# Patient Record
Sex: MALE | Race: WHITE | NOT HISPANIC OR LATINO | Employment: OTHER | URBAN - METROPOLITAN AREA
[De-identification: names, ages, dates, MRNs, and addresses within clinical notes are randomized per-mention and may not be internally consistent; named-entity substitution may affect disease eponyms.]

---

## 2017-02-24 ENCOUNTER — OFFICE VISIT (OUTPATIENT)
Dept: INTERNAL MEDICINE | Facility: IMAGING CENTER | Age: 82
End: 2017-02-24
Payer: MEDICARE

## 2017-02-24 VITALS
WEIGHT: 187 LBS | TEMPERATURE: 97 F | SYSTOLIC BLOOD PRESSURE: 110 MMHG | HEART RATE: 109 BPM | HEIGHT: 68 IN | RESPIRATION RATE: 16 BRPM | OXYGEN SATURATION: 95 % | DIASTOLIC BLOOD PRESSURE: 70 MMHG | BODY MASS INDEX: 28.34 KG/M2

## 2017-02-24 DIAGNOSIS — F02.80 ALZHEIMER'S DEMENTIA WITHOUT BEHAVIORAL DISTURBANCE, UNSPECIFIED TIMING OF DEMENTIA ONSET: ICD-10-CM

## 2017-02-24 DIAGNOSIS — G30.9 ALZHEIMER'S DEMENTIA WITHOUT BEHAVIORAL DISTURBANCE, UNSPECIFIED TIMING OF DEMENTIA ONSET: ICD-10-CM

## 2017-02-24 PROCEDURE — G8599 NO ASA/ANTIPLAT THER USE RNG: HCPCS | Performed by: INTERNAL MEDICINE

## 2017-02-24 PROCEDURE — 1101F PT FALLS ASSESS-DOCD LE1/YR: CPT | Mod: 8P | Performed by: INTERNAL MEDICINE

## 2017-02-24 PROCEDURE — G8482 FLU IMMUNIZE ORDER/ADMIN: HCPCS | Performed by: INTERNAL MEDICINE

## 2017-02-24 PROCEDURE — G8432 DEP SCR NOT DOC, RNG: HCPCS | Performed by: INTERNAL MEDICINE

## 2017-02-24 PROCEDURE — G8420 CALC BMI NORM PARAMETERS: HCPCS | Performed by: INTERNAL MEDICINE

## 2017-02-24 PROCEDURE — 1036F TOBACCO NON-USER: CPT | Performed by: INTERNAL MEDICINE

## 2017-02-24 PROCEDURE — 99213 OFFICE O/P EST LOW 20 MIN: CPT | Performed by: INTERNAL MEDICINE

## 2017-02-24 PROCEDURE — 4040F PNEUMOC VAC/ADMIN/RCVD: CPT | Performed by: INTERNAL MEDICINE

## 2017-02-24 RX ORDER — RIVASTIGMINE 4.6 MG/24H
1 PATCH, EXTENDED RELEASE TRANSDERMAL DAILY
Qty: 30 PATCH | Refills: 3 | Status: SHIPPED | OUTPATIENT
Start: 2017-02-24 | End: 2017-05-12

## 2017-02-24 NOTE — MR AVS SNAPSHOT
"        Anthony Coleman   2017 2:30 PM   Office Visit   MRN: [id number]    [facility]  [facility]   Dept Phone:  [phone]    Description:  Male : 1929   Provider:  Phil Mcgee M.D.           Allergies as of 2017     Allergen Noted Reactions    Pcn [Penicillins] 2009   Itching    Sulfa Drugs 2009   Itching      You were diagnosed with     Alzheimer's dementia without behavioral disturbance, unspecified timing of dementia onset   [[id number]]         Vital Signs     Blood Pressure Pulse Temperature Respirations Height Weight    110/70 mmHg 109 36.1 °C (97 °F) 16 1.727 m (5' 8\") 84.823 kg (187 lb)    Body Mass Index Oxygen Saturation Smoking Status             28.44 kg/m2 95% Never Smoker          Basic Information     Date Of Birth Sex Race Ethnicity Preferred Language    1929 Male White Non- English      Problem List              ICD-10-CM Priority Class Noted - Resolved    Depression F32.9   Unknown - Present    Erectile dysfunction N52.9   Unknown - Present    Hyperlipemia E78.5   Unknown - Present    Hypertension I10   Unknown - Present    Hypogonadism    Unknown - Present    Hypogonadism male E29.1   2009 - Present    Aortic stenosis I35.0   2012 - Present    PVC (premature ventricular contraction) I49.3   2012 - Present    CAD (coronary artery disease) I25.10   2012 - Present    Elevated PSA R97.20   2012 - Present    Anesthesia of skin R20.0   3/12/2013 - Present    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   2015 - Present    Status post aortic valve replacement Z95.2   2015 - Present    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 Review all of your home medications and newly ordered medications with your provider and/or pharmacist. Follow medication instructions as directed by your provider and/or pharmacist. Please keep your medication list with you and share with your provider. Update the information when medications are discontinued, doses are changed, or new medications (including over-the-counter products) are added; and carry medication information at all times in the event of emergency situations     Allergies:  PCN - Itching     SULFA DRUGS - Itching               Medications  Valid as of: February 24, 2017 -  4:52 PM    Generic Name Brand Name Tablet Size Instructions for use    Donepezil HCl (Tab) ARICEPT 10 MG Take 1 Tab by mouth every day.        Escitalopram Oxalate (Tab) LEXAPRO 20 MG Take 1 Tab by mouth every day.        HydroCHLOROthiazide (Tab) HYDRODIURIL 25 MG TAKE 1 TABLET EVERY DAY        Ipratropium Bromide (Solution) ATROVENT 0.03 % Spray 2 Sprays in nose every 12 hours. As needed for rhinitis        Losartan Potassium (Tab) COZAAR 50 MG Take 1 Tab by mouth every day.        Memantine HCl (Tab) Memantine HCl 5 (28)-10 (21) MG Take as directed        Memantine HCl (Tab) Memantine HCl 5 (28)-10 (21) MG Follow titration pack directions        Rivastigmine (PATCH 24 HR) EXELON 4.6 MG/24HR Apply 1 Patch to skin as directed every day.        Triamcinolone Acetonide (Cream) KENALOG 0.1 % Apply to back as needed for skin condition/rash        .                 Medicines prescribed today were sent to:     [facility]/PHARMACY #[id number] - [place] - [address]    [address] [address]    Phone: [phone] Fax: [phone]    [place] [address]  Hours?: No    [facility] [place] [facility] [address] AT PORTAL TO REGISTERED [facility] SITES    [address] [place] [address]    Phone: [phone] Fax: [phone]    Open 24 Hours?: No      Medication refill instructions:       If your prescription bottle indicates you have medication refills left, it is not necessary to call your provider’s office. Please contact your pharmacy and they will refill your medication.    If your prescription bottle indicates you do not have any refills left, you may request refills at any time through one of the following ways: The online [employer] system (except Urgent Care), by calling your provider’s office, or by asking your pharmacy to contact your provider’s office with a refill request. Medication refills are processed only during regular business hours and may not be available until the next business day. Your provider may request additional information or to have a follow-up visit with you prior to refilling your medication.   *Please Note: Medication refills are assigned a new Rx number when refilled electronically. Your pharmacy may indicate that no refills were authorized even though a new prescription for the same medication is available at the pharmacy. Please request the medicine by name with the pharmacy before contacting your provider for a refill.        Other Notes About Your Plan     Colonoscopy Not indicated Dexa  PSA    A1c  Psych-[facility] ([phone]) Surgery-[facility] Urology-[facility] Oncology-[facility] Ophth-[facility]  Cardio - [facility]  11/7/13-Opt referral Dr. Portillo -- 5/5/14 Pt schedule and cancelled, never rescheduled.            MyChart Status: Patient Declined

## 2017-03-01 NOTE — PROGRESS NOTES
"Chief Complaint   Patient presents with   • Dementia       HISTORY OF THE PRESENT ILLNESS: Patient is a 87 y.o. male. Patient comes in with his wife for evaluation of episode of confusion and emotional irritability. This occurred after he went to the store. He first stopped at the pet store. He was intending to go to the grocery store but could not remember where to go or why he was to go there. He returned home and became emotionally upset. He is feeling better now. He is able to recall why he was going to the store. He is able to recall the events of the pet store.       Allergies: Pcn and Sulfa drugs    Current Outpatient Prescriptions Ordered in [facility]   Medication Sig Dispense Refill   • rivastigmine (EXELON) 4.6 MG/24HR patch Apply 1 Patch to skin as directed every day. 30 Patch 3   • Memantine HCl 5 (28)-10 (21) MG Tab Follow titration pack directions 1 Tab 0   • donepezil (ARICEPT) 10 MG tablet Take 1 Tab by mouth every day. 30 Tab 3   • Memantine HCl (NAMENDA TITRATION RYLAN) 5 (28)-10 (21) MG Tab Take as directed (Patient not taking: Reported on 10/26/2016) 49 Tab 0   • escitalopram (LEXAPRO) 20 MG tablet Take 1 Tab by mouth every day. 90 Tab 3   • losartan (COZAAR) 50 MG Tab Take 1 Tab by mouth every day. 90 Tab 3   • hydrochlorothiazide (HYDRODIURIL) 25 MG Tab TAKE 1 TABLET EVERY DAY (Patient not taking: Reported on 10/26/2016) 90 Tab 3   • ipratropium (ATROVENT) 0.03 % Solution Spray 2 Sprays in nose every 12 hours. As needed for rhinitis 3 Bottle 3   • triamcinolone acetonide (KENALOG) 0.1 % CREA Apply to back as needed for skin condition/rash 3 Tube 3     No current Epic-ordered facility-administered medications on file.       Past medical history, social history and family history were reviewed from chart today    Review of systems: Per HPI.   All others negative.     Exam: Blood pressure 110/70, pulse 109, temperature 36.1 °C (97 °F), resp. rate 16, height 1.727 m (5' 8\"), weight 84.823 kg (187 lb), " SpO2 95 %.  General: Well-appearing. Well-developed. No signs of distress.  HEENT: Grossly normal. Oral cavity is pink and moist.  Neck: Supple without JVD or bruit.  Pulmonary: Clear with good breath sounds. Normal effort.  Cardiovascular: Regular. Carotid and radial pulses are intact.  Abdomen: Soft, nontender, nondistended. Spleen and liver are not enlarged.  Neurologic: Cranial nerves II through XII are grossly normal, alert and oriented x3      Assessment/Plan  1. Alzheimer's dementia without behavioral disturbance, unspecified timing of dementia onset  rivastigmine (EXELON) 4.6 MG/24HR patch         Patient with moderate dementia. He has been diagnosed with Alzheimer's disease by neurology. He previously failed Aricept because of nightmares. His insurance would not cover Namenda.    I recommended a trial of Exelon. I discussed with the patient and his wife that this is similar to Aricept and he may experience similar side effects. If he does they should discontinue the patch. I also discussed with him the importance of rotating the area that the patch is applied. We also discussed appropriate places that the patch can be applied. We will increase the dose as tolerated. I would like to continue to pursue coadministration of Namenda if we get his insurance to cover this medication which would be appropriate for his condition. The medications have been shown to work better in conjunction. This may improve his ability to perform activities of daily living and improve his overall quality of life.

## 2017-03-22 ENCOUNTER — OFFICE VISIT (OUTPATIENT)
Dept: INTERNAL MEDICINE | Facility: IMAGING CENTER | Age: 82
End: 2017-03-22
Payer: MEDICARE

## 2017-03-22 VITALS
RESPIRATION RATE: 14 BRPM | BODY MASS INDEX: 28.34 KG/M2 | DIASTOLIC BLOOD PRESSURE: 60 MMHG | WEIGHT: 187 LBS | TEMPERATURE: 97.2 F | OXYGEN SATURATION: 95 % | HEART RATE: 94 BPM | SYSTOLIC BLOOD PRESSURE: 106 MMHG | HEIGHT: 68 IN

## 2017-03-22 DIAGNOSIS — L98.9 SKIN LESION OF HAND: ICD-10-CM

## 2017-03-22 PROCEDURE — 99213 OFFICE O/P EST LOW 20 MIN: CPT | Performed by: FAMILY MEDICINE

## 2017-03-22 PROCEDURE — G8599 NO ASA/ANTIPLAT THER USE RNG: HCPCS | Performed by: FAMILY MEDICINE

## 2017-03-22 PROCEDURE — G8482 FLU IMMUNIZE ORDER/ADMIN: HCPCS | Performed by: FAMILY MEDICINE

## 2017-03-22 PROCEDURE — 1101F PT FALLS ASSESS-DOCD LE1/YR: CPT | Mod: 8P | Performed by: FAMILY MEDICINE

## 2017-03-22 PROCEDURE — 1036F TOBACCO NON-USER: CPT | Performed by: FAMILY MEDICINE

## 2017-03-22 PROCEDURE — 4040F PNEUMOC VAC/ADMIN/RCVD: CPT | Performed by: FAMILY MEDICINE

## 2017-03-22 PROCEDURE — G8419 CALC BMI OUT NRM PARAM NOF/U: HCPCS | Performed by: FAMILY MEDICINE

## 2017-03-22 PROCEDURE — G8432 DEP SCR NOT DOC, RNG: HCPCS | Performed by: FAMILY MEDICINE

## 2017-03-22 RX ORDER — CEPHALEXIN 500 MG/1
500 CAPSULE ORAL 4 TIMES DAILY
Qty: 40 CAP | Refills: 0 | Status: SHIPPED | OUTPATIENT
Start: 2017-03-22 | End: 2017-05-12

## 2017-03-22 NOTE — PROGRESS NOTES
"CC: R hand lesion    HPI:  Patient is a 87 y.o. male established patient of Dr. Mcgee who presents today for evaluation of a new small lesion on the back of hand present for the past few weeks. Pt is a retired physician and attempted to \"unroof\" lesion with a sharp object. He was not successful and a thick white scab is present. Pt states that the lesion is tender with direct palpation/ denies trauma/ unclear if there is a foreign object in area/ no associated symptoms/ no OTC treatment used. Pt otherwise feels well and states that his chronic medical issues are well controlled.     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     Medications and allergies reviewed in Epic chart by me today.     ROS:  Pertinent positives listed above in HPI. All other systems have been reviewed and are negative.    PE:   /60 mmHg  Pulse 94  Temp(Src) 36.2 °C (97.2 °F)  Resp 14  Ht 1.727 m (5' 7.99\")  Wt 84.823 kg (187 lb)  BMI 28.44 kg/m2  SpO2 95%  Vital signs reviewed with patient.     Gen: Well developed; well nourished; no acute distress; age appropriate appearance; very happy affect  R hand: very small pea size white scab noted over affected skin area on back of hand/ minor elevation of area off skin surface/ very faint redness/ no fluctuance/ very tender with direct palpation/ no drainage/ no foreign object noted  Neuro: No major focal deficits noted "   Psych: AAOx3; mood and affect are appropriate for age and chronic diagnoses    A/P:  1. Skin lesion of hand of unclear etiology  At this time, there is nothing to incise or drain. Will try antibiotic trial/ OTC pain relief as needed with re-evaluation next week with Dr. Mcgee if it does not improve/ resolve. Pt educated to avoid touching area and not to try to unroof it further.   - cephALEXin (KEFLEX) 500 MG Cap; Take 1 Cap by mouth 4 times a day.  Dispense: 40 Cap; Refill: 0

## 2017-03-22 NOTE — MR AVS SNAPSHOT
"        Anthony Coleman   3/22/2017 2:00 PM   Office Visit   MRN: [id number]    [facility]   Dept Phone:  [phone]    Description:  Male : 1929   Provider:  Carina Zamora M.D.           Allergies as of 3/22/2017     Allergen Noted Reactions    Pcn [Penicillins] 2009   Itching    Sulfa Drugs 2009   Itching      You were diagnosed with     Skin lesion of hand   [[id number]]         Vital Signs     Blood Pressure Pulse Temperature Respirations Height Weight    106/60 mmHg 94 36.2 °C (97.2 °F) 14 1.727 m (5' 7.99\") 84.823 kg (187 lb)    Body Mass Index Oxygen Saturation Smoking Status             28.44 kg/m2 95% Never Smoker          Basic Information     Date Of Birth Sex Race Ethnicity Preferred Language    1929 Male White Non- English      Problem List              ICD-10-CM Priority Class Noted - Resolved    Depression F32.9   Unknown - Present    Erectile dysfunction N52.9   Unknown - Present    Hyperlipemia E78.5   Unknown - Present    Hypertension I10   Unknown - Present    Hypogonadism    Unknown - Present    Hypogonadism male E29.1   2009 - Present    Aortic stenosis I35.0   2012 - Present    PVC (premature ventricular contraction) I49.3   2012 - Present    CAD (coronary artery disease) I25.10   2012 - Present    Elevated PSA R97.20   2012 - Present    Anesthesia of skin R20.0   3/12/2013 - Present    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   2015 - Present    Status post aortic valve replacement Z95.2   2015 - Present    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 Review all of your home medications and newly ordered medications with your provider and/or pharmacist. Follow medication instructions as directed by your provider and/or pharmacist. Please keep your medication list with you and share with your provider. Update the information when medications are discontinued, doses are changed, or new medications (including over-the-counter products) are added; and carry medication information at all times in the event of emergency situations     Allergies:  PCN - Itching     SULFA DRUGS - Itching               Medications  Valid as of: March 22, 2017 -  3:04 PM    Generic Name Brand Name Tablet Size Instructions for use    Cephalexin (Cap) KEFLEX 500 MG Take 1 Cap by mouth 4 times a day.        Donepezil HCl (Tab) ARICEPT 10 MG Take 1 Tab by mouth every day.        Escitalopram Oxalate (Tab) LEXAPRO 20 MG Take 1 Tab by mouth every day.        HydroCHLOROthiazide (Tab) HYDRODIURIL 25 MG TAKE 1 TABLET EVERY DAY        Ipratropium Bromide (Solution) ATROVENT 0.03 % Spray 2 Sprays in nose every 12 hours. As needed for rhinitis        Losartan Potassium (Tab) COZAAR 50 MG Take 1 Tab by mouth every day.        Memantine HCl (Tab) Memantine HCl 5 (28)-10 (21) MG Take as directed        Memantine HCl (Tab) Memantine HCl 5 (28)-10 (21) MG Follow titration pack directions        Rivastigmine (PATCH 24 HR) EXELON 4.6 MG/24HR Apply 1 Patch to skin as directed every day.        Triamcinolone Acetonide (Cream) KENALOG 0.1 % Apply to back as needed for skin condition/rash        .                 Medicines prescribed today were sent to:     [facility]/PHARMACY #[id number] - [place] - [address] [address]    Phone: [phone] Fax: [phone]      Open 24 Hours?: No    [facility] [place] - [address] AT PORTAL TO REGISTERED [facility] SITES    [address] [facility] [address]    Phone: [phone] Fax: [phone]    Open 24 Hours?: No      Medication refill instructions:       If your prescription bottle indicates you have medication refills left, it is not necessary to call your provider’s office. Please contact your pharmacy and they will refill your medication.    If your prescription bottle indicates you do not have any refills left, you may request refills at any time through one of the following ways: The online [employer] system (except Urgent Care), by calling your provider’s office, or by asking your pharmacy to contact your provider’s office with a refill request. Medication refills are processed only during regular business hours and may not be available until the next business day. Your provider may request additional information or to have a follow-up visit with you prior to refilling your medication.   *Please Note: Medication refills are assigned a new Rx number when refilled electronically. Your pharmacy may indicate that no refills were authorized even though a new prescription for the same medication is available at the pharmacy. Please request the medicine by name with the pharmacy before contacting your provider for a refill.        Other Notes About Your Plan     Colonoscopy Not indicated Dexa  PSA    A1c  Psych-[facility] ([phone]) Surgery-[facility] Urology-[facility] Oncology-[facility] Op[facility]  Cardio - [facility]  11/7/13-Opt referral Dr. Portillo -- 5/5/14 Pt schedule and cancelled, never rescheduled.            MyChart Status: Patient Declined

## 2017-03-23 ENCOUNTER — OFFICE VISIT (OUTPATIENT)
Dept: INTERNAL MEDICINE | Facility: IMAGING CENTER | Age: 82
End: 2017-03-23
Payer: MEDICARE

## 2017-03-23 VITALS
TEMPERATURE: 97.3 F | WEIGHT: 187 LBS | HEART RATE: 80 BPM | BODY MASS INDEX: 28.34 KG/M2 | SYSTOLIC BLOOD PRESSURE: 110 MMHG | HEIGHT: 68 IN | RESPIRATION RATE: 14 BRPM | OXYGEN SATURATION: 97 % | DIASTOLIC BLOOD PRESSURE: 64 MMHG

## 2017-03-23 DIAGNOSIS — M25.562 ACUTE PAIN OF LEFT KNEE: ICD-10-CM

## 2017-03-23 PROCEDURE — G8482 FLU IMMUNIZE ORDER/ADMIN: HCPCS | Performed by: FAMILY MEDICINE

## 2017-03-23 PROCEDURE — G8432 DEP SCR NOT DOC, RNG: HCPCS | Performed by: FAMILY MEDICINE

## 2017-03-23 PROCEDURE — 99213 OFFICE O/P EST LOW 20 MIN: CPT | Performed by: FAMILY MEDICINE

## 2017-03-23 PROCEDURE — 4040F PNEUMOC VAC/ADMIN/RCVD: CPT | Performed by: FAMILY MEDICINE

## 2017-03-23 PROCEDURE — G8599 NO ASA/ANTIPLAT THER USE RNG: HCPCS | Performed by: FAMILY MEDICINE

## 2017-03-23 PROCEDURE — G8419 CALC BMI OUT NRM PARAM NOF/U: HCPCS | Performed by: FAMILY MEDICINE

## 2017-03-23 PROCEDURE — 1101F PT FALLS ASSESS-DOCD LE1/YR: CPT | Mod: 8P | Performed by: FAMILY MEDICINE

## 2017-03-23 PROCEDURE — 1036F TOBACCO NON-USER: CPT | Performed by: FAMILY MEDICINE

## 2017-03-23 NOTE — PROGRESS NOTES
"Chief Complaint   Patient presents with   • Knee Pain     left x 24 hours       HPI:  Patient is a 87 y.o. male established patient of Dr. Mcgee who presents today to have new L knee pain evaluated. I saw pt yesterday afternoon for a R back of hand lesion, and he did not mention anything about L knee pain at that visit. He states that his R hand lesion is already improving with initiation of abx treatment last night. Pt states that the L knee pain has been present for greater than 1 day but he cannot give me a specific time line. He denies trauma but he plays water volleyball multiple times a week (most recently yesterday). Pt feels that this pain is affecting his gait but staff observed pt walking normally for him both yesterday and today at the office. He cannot recall having pain in this area before. He freely admits he has a very low pain tolerance overall. His wife is present at today's appointment.     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     Medications and allergies reviewed in Epic chart by me today.     ROS:  Pertinent positives listed above in HPI. All other systems have been reviewed and are negative.    PE:   /64 mmHg  Pulse 80  Temp(Src) 36.3 °C (97.3 °F)  Resp 14  Ht 1.727 m (5' 7.99\")  Wt 84.823 kg (187 lb)  BMI 28.44 kg/m2  SpO2 97%  Vital signs reviewed with patient.     Gen: Well developed; well nourished; no " acute distress; age appropriate appearance   L Knee: with b/l knee comparison, no deformity noted of L knee/ no overt bruising or edema noted/ pt has pinpoint tenderness with direct palpation over L medial condyle area and distal to that area. No local edema noted/ no overlying erythema. Full ROM of L knee joint noted; gait normal for this pt witnessed today and yesterday   Neuro: No focal deficits noted   Psych: AAOx3; mood and affect are appropriate    A/P:  1. Acute pain of left knee of unclear etiology  I do not feel that this condition warrants imaging at this time. I recommended supportive care including rest/ icing affected area for 20 minutes on/ 60 minutes off with skin protection/ OTC pain relief medication PRN and no water volleyball this week.     Pt is to follow-up directly with Dr. Mcgee next week if this condition does not improve.

## 2017-03-23 NOTE — MR AVS SNAPSHOT
"        Anthony Coleman   3/23/2017 11:30 AM   Office Visit   MRN: [id number]    Department:  [facility]   Dept Phone:  [phone]    Description:  Male : 1929   Provider:  Carina Zamora M.D.           Reason for Visit     Knee Pain left x 24 hours      Allergies as of 3/23/2017     Allergen Noted Reactions    Pcn [Penicillins] 2009   Itching    Sulfa Drugs 2009   Itching      You were diagnosed with     Acute pain of left knee   [[id number]]         Vital Signs     Blood Pressure Pulse Temperature Respirations Height Weight    110/64 mmHg 80 36.3 °C (97.3 °F) 14 1.727 m (5' 7.99\") 84.823 kg (187 lb)    Body Mass Index Oxygen Saturation Smoking Status             28.44 kg/m2 97% Never Smoker          Basic Information     Date Of Birth Sex Race Ethnicity Preferred Language    1929 Male White Non- English      Problem List              ICD-10-CM Priority Class Noted - Resolved    Depression F32.9   Unknown - Present    Erectile dysfunction N52.9   Unknown - Present    Hyperlipemia E78.5   Unknown - Present    Hypertension I10   Unknown - Present    Hypogonadism    Unknown - Present    Hypogonadism male E29.1   2009 - Present    Aortic stenosis I35.0   2012 - Present    PVC (premature ventricular contraction) I49.3   2012 - Present    CAD (coronary artery disease) I25.10   2012 - Present    Elevated PSA R97.20   2012 - Present    Anesthesia of skin R20.0   3/12/2013 - Present    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   2015 - Present    Status post aortic valve replacement Z95.2   2015 - Present    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 Review all of your home medications and newly ordered medications with your provider and/or pharmacist. Follow medication instructions as directed by your provider and/or pharmacist. Please keep your medication list with you and share with your provider. Update the information when medications are discontinued, doses are changed, or new medications (including over-the-counter products) are added; and carry medication information at all times in the event of emergency situations     Allergies:  PCN - Itching     SULFA DRUGS - Itching               Medications  Valid as of: March 23, 2017 -  1:19 PM    Generic Name Brand Name Tablet Size Instructions for use    Cephalexin (Cap) KEFLEX 500 MG Take 1 Cap by mouth 4 times a day.        Donepezil HCl (Tab) ARICEPT 10 MG Take 1 Tab by mouth every day.        Escitalopram Oxalate (Tab) LEXAPRO 20 MG Take 1 Tab by mouth every day.        HydroCHLOROthiazide (Tab) HYDRODIURIL 25 MG TAKE 1 TABLET EVERY DAY        Ipratropium Bromide (Solution) ATROVENT 0.03 % Spray 2 Sprays in nose every 12 hours. As needed for rhinitis        Losartan Potassium (Tab) COZAAR 50 MG Take 1 Tab by mouth every day.        Memantine HCl (Tab) Memantine HCl 5 (28)-10 (21) MG Take as directed        Memantine HCl (Tab) Memantine HCl 5 (28)-10 (21) MG Follow titration pack directions        Rivastigmine (PATCH 24 HR) EXELON 4.6 MG/24HR Apply 1 Patch to skin as directed every day.        Triamcinolone Acetonide (Cream) KENALOG 0.1 % Apply to back as needed for skin condition/rash        .                 Medicines prescribed today were sent to:     [id number] - [place], [place] - [address] [place]  [place] [address]    Phone: [phone] Fax: [phone]    Open 24 Hours?: No    [facility] [place], [place] - [address] AT PORTAL TO REGISTERED [facility] SITES    [address] [facility] [address]    Phone: [phone] Fax: [phone]    Open 24 Hours?: No      Medication refill instructions:       If your prescription bottle indicates you have medication refills left, it is not necessary to call your provider’s office. Please contact your pharmacy and they will refill your medication.    If your prescription bottle indicates you do not have any refills left, you may request refills at any time through one of the following ways: The online [employer] system (except Urgent Care), by calling your provider’s office, or by asking your pharmacy to contact your provider’s office with a refill request. Medication refills are processed only during regular business hours and may not be available until the next business day. Your provider may request additional information or to have a follow-up visit with you prior to refilling your medication.   *Please Note: Medication refills are assigned a new Rx number when refilled electronically. Your pharmacy may indicate that no refills were authorized even though a new prescription for the same medication is available at the pharmacy. Please request the medicine by name with the pharmacy before contacting your provider for a refill.        Other Notes About Your Plan     Colonoscopy Not indicated Dexa  PSA    A1c  Psych-[facility] ([phone]) Surgery-[facility] Urology-[facility] Oncology-[place] Ophth-[place]  Cardio - [facility]-[place]  11/7/13-Opt referral Dr. Portillo -- 5/5/14 Pt schedule and cancelled, never rescheduled.            MyChart Status: Patient Declined

## 2017-04-11 DIAGNOSIS — I10 ESSENTIAL HYPERTENSION: ICD-10-CM

## 2017-04-11 RX ORDER — HYDROCHLOROTHIAZIDE 25 MG/1
TABLET ORAL
Qty: 90 TAB | Refills: 3 | Status: SHIPPED | OUTPATIENT
Start: 2017-04-11 | End: 2018-05-16 | Stop reason: SDUPTHER

## 2017-04-13 ENCOUNTER — HOSPITAL ENCOUNTER (OUTPATIENT)
Facility: MEDICAL CENTER | Age: 82
End: 2017-04-13
Attending: INTERNAL MEDICINE
Payer: MEDICARE

## 2017-04-13 ENCOUNTER — OFFICE VISIT (OUTPATIENT)
Dept: INTERNAL MEDICINE | Facility: IMAGING CENTER | Age: 82
End: 2017-04-13
Payer: MEDICARE

## 2017-04-13 VITALS
BODY MASS INDEX: 28.49 KG/M2 | OXYGEN SATURATION: 95 % | DIASTOLIC BLOOD PRESSURE: 60 MMHG | WEIGHT: 188 LBS | SYSTOLIC BLOOD PRESSURE: 102 MMHG | HEART RATE: 108 BPM | TEMPERATURE: 96.6 F | HEIGHT: 68 IN | RESPIRATION RATE: 16 BRPM

## 2017-04-13 DIAGNOSIS — D48.5 NEOPLASM OF UNCERTAIN BEHAVIOR OF SKIN: ICD-10-CM

## 2017-04-13 LAB — PATHOLOGY CONSULT NOTE: NORMAL

## 2017-04-13 PROCEDURE — 11100 PR BIOPSY OF SKIN LESION: CPT | Performed by: INTERNAL MEDICINE

## 2017-04-13 PROCEDURE — 88305 TISSUE EXAM BY PATHOLOGIST: CPT

## 2017-04-13 PROCEDURE — 99999 PR NO CHARGE: CPT | Performed by: INTERNAL MEDICINE

## 2017-04-14 NOTE — PROGRESS NOTES
Chief Complaint   Patient presents with   • Skin Lesion       HISTORY OF THE PRESENT ILLNESS: Patient is a 87 y.o. male.     Patient comes in for evaluation of a growth on his right hand. This is been ongoing for weeks or longer. He believes it is getting larger in size. He was treated with antibiotics with some improvement in are surrounding erythema but the lesion remains tender.    I discussed with the patient that I believe the skin lesion seems most consistent with basal cell carcinoma. Cannot exclude nodular squamous cell. I recommended biopsy. He was agreeable. We discussed the risks including bleeding, infection and scarring. He agreed to proceed.    Procedure:  skin biopsy for evaluation of atypical skin lesion.  The area was cleaned with alcohol and anesthetized with 1% lidocaine with epinephrine. It was then cleaned with Betadine. The lesion was biopsy with 5 mm punch biopsy. Compression is used for hemostasis. Three simple Vycril 30 sutures were placed. No complications. Post wound care was discussed.    Patient was instructed that he should not play water volleyball and should keep covered for one week until the sutures are removed.       Allergies: Pcn and Sulfa drugs    Current Outpatient Prescriptions Ordered in [facility]   Medication Sig Dispense Refill   • hydrochlorothiazide (HYDRODIURIL) 25 MG Tab TAKE 1 TABLET EVERY DAY 90 Tab 3   • cephALEXin (KEFLEX) 500 MG Cap Take 1 Cap by mouth 4 times a day. 40 Cap 0   • rivastigmine (EXELON) 4.6 MG/24HR patch Apply 1 Patch to skin as directed every day. 30 Patch 3   • Memantine HCl 5 (28)-10 (21) MG Tab Follow titration pack directions 1 Tab 0   • donepezil (ARICEPT) 10 MG tablet Take 1 Tab by mouth every day. 30 Tab 3   • Memantine HCl (NAMENDA TITRATION RYLAN) 5 (28)-10 (21) MG Tab Take as directed (Patient not taking: Reported on 10/26/2016) 49 Tab 0   • escitalopram (LEXAPRO) 20 MG tablet Take 1 Tab by mouth every day. 90 Tab 3   • losartan (COZAAR) 50 MG  "Tab Take 1 Tab by mouth every day. 90 Tab 3   • ipratropium (ATROVENT) 0.03 % Solution Spray 2 Sprays in nose every 12 hours. As needed for rhinitis 3 Bottle 3   • triamcinolone acetonide (KENALOG) 0.1 % CREA Apply to back as needed for skin condition/rash 3 Tube 3     No current Epic-ordered facility-administered medications on file.       Past medical history, social history and family history were reviewed from chart today    Review of systems: Per HPI.   All others negative.     Exam: Blood pressure 102/60, pulse 108, temperature 35.9 °C (96.6 °F), resp. rate 16, height 1.727 m (5' 8\"), weight 85.276 kg (188 lb), SpO2 95 %.  General: Well-nourished, well-developed. No change in appearance. No distress.  HEENT: Normocephalic.  Pulmonary: Clear.. Normal effort.  Cardiovascular: Regular   Abdomen: Normal appearing. Soft, nontender, nondistended.   Neurologic: Cranial nerves II through XII are grossly intact, alert and oriented x3  Skin: dorsum of right hand has a nodular, scaly, flesh colored lesion measuring approximately .5 cm circular. It is tender to palpate.        Assessment/Plan  1. Neoplasm of uncertain behavior of skin         Patient comes in for evaluation of a growth on his right hand. This is been ongoing for weeks or longer. He believes it is getting larger in size. He was treated with antibiotics with some improvement in are surrounding erythema but the lesion remains tender.    I discussed with the patient that I believe the skin lesion seems most consistent with basal cell carcinoma. Cannot exclude nodular squamous cell. I recommended biopsy. He was agreeable. We discussed the risks including bleeding, infection and scarring. He agreed to proceed.    Procedure:  skin biopsy for evaluation of atypical skin lesion.  The area was cleaned with alcohol and anesthetized with 1% lidocaine with epinephrine. It was then cleaned with Betadine. The lesion was biopsy with 5 mm punch biopsy. Compression is used " for hemostasis. Three simple Vycril 30 sutures were placed. No complications. Post wound care was discussed.    Patient was instructed that he should not play water volleyball and should keep covered for one week until the sutures are removed.

## 2017-04-20 ENCOUNTER — NON-PROVIDER VISIT (OUTPATIENT)
Dept: INTERNAL MEDICINE | Facility: IMAGING CENTER | Age: 82
End: 2017-04-20
Payer: MEDICARE

## 2017-04-20 NOTE — MR AVS SNAPSHOT
Anthony Coleman   2017 10:00 AM   Non-Provider Visit   MRN: [id number]    Department:  [facility]   Dept Phone:  [phone]    Description:  Male : 1929   Provider:  Joanie Harmon R.N.           Reason for Visit     Suture / Staple Removal           Allergies as of 2017     Allergen Noted Reactions    Pcn [Penicillins] 2009   Itching    Sulfa Drugs 2009   Itching      Vital Signs     Smoking Status                   Never Smoker            Basic Information     Date Of Birth Sex Race Ethnicity Preferred Language    1929 Male White Non- English      Problem List              ICD-10-CM Priority Class Noted - Resolved    Depression F32.9   Unknown - Present    Erectile dysfunction N52.9   Unknown - Present    Hyperlipemia E78.5   Unknown - Present    Hypertension I10   Unknown - Present    Hypogonadism    Unknown - Present    Hypogonadism male E29.1   2009 - Present    Aortic stenosis I35.0   2012 - Present    PVC (premature ventricular contraction) I49.3   2012 - Present    CAD (coronary artery disease) I25.10   2012 - Present    Elevated PSA R97.20   2012 - Present    Anesthesia of skin R20.0   3/12/2013 - Present    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   2015 - Present    Status post aortic valve replacement Z95.2   2015 - Present    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 Review all of your home medications and newly ordered medications with your provider and/or pharmacist. Follow medication instructions as directed by your provider and/or pharmacist. Please keep your medication list with you and share with your provider. Update the information when medications are discontinued, doses are changed, or new medications (including over-the-counter products) are added; and carry medication information at all times in the event of emergency situations     Allergies:  PCN - Itching     SULFA DRUGS - Itching               Medications  Valid as of: April 20, 2017 -  1:56 PM    Generic Name Brand Name Tablet Size Instructions for use    Cephalexin (Cap) KEFLEX 500 MG Take 1 Cap by mouth 4 times a day.        Donepezil HCl (Tab) ARICEPT 10 MG Take 1 Tab by mouth every day.        Escitalopram Oxalate (Tab) LEXAPRO 20 MG Take 1 Tab by mouth every day.        HydroCHLOROthiazide (Tab) HYDRODIURIL 25 MG TAKE 1 TABLET EVERY DAY        Ipratropium Bromide (Solution) ATROVENT 0.03 % Spray 2 Sprays in nose every 12 hours. As needed for rhinitis        Losartan Potassium (Tab) COZAAR 50 MG Take 1 Tab by mouth every day.        Memantine HCl (Tab) Memantine HCl 5 (28)-10 (21) MG Take as directed        Memantine HCl (Tab) Memantine HCl 5 (28)-10 (21) MG Follow titration pack directions        Rivastigmine (PATCH 24 HR) EXELON 4.6 MG/24HR Apply 1 Patch to skin as directed every day.        Triamcinolone Acetonide (Cream) KENALOG 0.1 % Apply to back as needed for skin condition/rash        .                 Medicines prescribed today were sent to:     [facility]/PHARMACY #[id number] - [place] - [address] [place] [place] [address]    Phone: [phone] Fax: [phone]    Open 24 Hours?: No    [facility] - [place] - [address] AT PORTAL TO REGISTERED [facility] SITES    [address] [facility] [address]    Phone: [phone]  Fax: [phone]    Open 24 Hours?: No      Medication refill instructions:       If your prescription bottle indicates you have medication refills left, it is not necessary to call your provider’s office. Please contact your pharmacy and they will refill your medication.    If your prescription bottle indicates you do not have any refills left, you may request refills at any time through one of the following ways: The online [employer] system (except Urgent Care), by calling your provider’s office, or by asking your pharmacy to contact your provider’s office with a refill request. Medication refills are processed only during regular business hours and may not be available until the next business day. Your provider may request additional information or to have a follow-up visit with you prior to refilling your medication.   *Please Note: Medication refills are assigned a new Rx number when refilled electronically. Your pharmacy may indicate that no refills were authorized even though a new prescription for the same medication is available at the pharmacy. Please request the medicine by name with the pharmacy before contacting your provider for a refill.        Other Notes About Your Plan     Colonoscopy Not indicated Dexa  PSA    A1c  Psych-[place] ([phone]) Surgery-[facility]rology-[facility] Oncology-[place] Ophth-[place]  Cardio - [facility]  11/7/13-[facility] referral Dr. Portillo -- 5/5/14 Pt schedule and cancelled, never rescheduled.            MyChart Status: Patient Declined

## 2017-05-09 ENCOUNTER — NON-PROVIDER VISIT (OUTPATIENT)
Dept: INTERNAL MEDICINE | Facility: IMAGING CENTER | Age: 82
End: 2017-05-09
Payer: MEDICARE

## 2017-06-02 ENCOUNTER — OFFICE VISIT (OUTPATIENT)
Dept: INTERNAL MEDICINE | Facility: IMAGING CENTER | Age: 82
End: 2017-06-02
Payer: MEDICARE

## 2017-06-02 VITALS
DIASTOLIC BLOOD PRESSURE: 60 MMHG | RESPIRATION RATE: 14 BRPM | WEIGHT: 191.4 LBS | BODY MASS INDEX: 29.01 KG/M2 | HEART RATE: 80 BPM | TEMPERATURE: 97.4 F | HEIGHT: 68 IN | SYSTOLIC BLOOD PRESSURE: 100 MMHG | OXYGEN SATURATION: 95 %

## 2017-06-02 DIAGNOSIS — R05.9 COUGH: ICD-10-CM

## 2017-06-02 DIAGNOSIS — N64.4 BREAST TENDERNESS: ICD-10-CM

## 2017-06-02 PROCEDURE — 1036F TOBACCO NON-USER: CPT | Performed by: INTERNAL MEDICINE

## 2017-06-02 PROCEDURE — 4040F PNEUMOC VAC/ADMIN/RCVD: CPT | Performed by: INTERNAL MEDICINE

## 2017-06-02 PROCEDURE — 1101F PT FALLS ASSESS-DOCD LE1/YR: CPT | Mod: 8P | Performed by: INTERNAL MEDICINE

## 2017-06-02 PROCEDURE — 99214 OFFICE O/P EST MOD 30 MIN: CPT | Performed by: INTERNAL MEDICINE

## 2017-06-02 PROCEDURE — G8599 NO ASA/ANTIPLAT THER USE RNG: HCPCS | Performed by: INTERNAL MEDICINE

## 2017-06-02 PROCEDURE — G8432 DEP SCR NOT DOC, RNG: HCPCS | Performed by: INTERNAL MEDICINE

## 2017-06-02 PROCEDURE — G8419 CALC BMI OUT NRM PARAM NOF/U: HCPCS | Performed by: INTERNAL MEDICINE

## 2017-06-02 NOTE — MR AVS SNAPSHOT
"        Anthony Coleman   2017 11:30 AM   Office Visit   MRN: [id number]    [facility]   Dept Phone:  [phone]    Description:  Male : 1929   Provider:  Phil Mcgee M.D.           Reason for Visit     Cough     Other tender left breast      Allergies as of 2017     Allergen Noted Reactions    Pcn [Penicillins] 2009   Itching    Sulfa Drugs 2009   Itching      You were diagnosed with     Cough   [786.2.ICD-9-CM]       Breast tenderness   [856868]         Vital Signs     Blood Pressure Pulse Temperature Respirations Height Weight    100/60 mmHg 80 36.3 °C (97.4 °F) 14 1.727 m (5' 7.99\") 86.818 kg (191 lb 6.4 oz)    Body Mass Index Oxygen Saturation Smoking Status             29.11 kg/m2 95% Never Smoker          Basic Information     Date Of Birth Sex Race Ethnicity Preferred Language    1929 Male White Non- English      Problem List              ICD-10-CM Priority Class Noted - Resolved    Depression F32.9   Unknown - Present    Erectile dysfunction N52.9   Unknown - Present    Hyperlipemia E78.5   Unknown - Present    Hypertension I10   Unknown - Present    Hypogonadism    Unknown - Present    Hypogonadism male E29.1   2009 - Present    Aortic stenosis I35.0   2012 - Present    PVC (premature ventricular contraction) I49.3   2012 - Present    CAD (coronary artery disease) I25.10   2012 - Present    Elevated PSA R97.20   2012 - Present    Anesthesia of skin R20.0   3/12/2013 - Present    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   2015 - Present    Status post aortic valve replacement Z95.2   2015 - Present    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 Review all of your home medications and newly ordered medications with your provider and/or pharmacist. Follow medication instructions as directed by your provider and/or pharmacist. Please keep your medication list with you and share with your provider. Update the information when medications are discontinued, doses are changed, or new medications (including over-the-counter products) are added; and carry medication information at all times in the event of emergency situations     Allergies:  PCN - Itching     SULFA DRUGS - Itching               Medications  Valid as of: June 02, 2017 - 12:02 PM    Generic Name Brand Name Tablet Size Instructions for use    Escitalopram Oxalate (Tab) LEXAPRO 20 MG Take 1 Tab by mouth every day.        HydroCHLOROthiazide (Tab) HYDRODIURIL 25 MG TAKE 1 TABLET EVERY DAY        Ipratropium Bromide (Solution) ATROVENT 0.03 % Spray 2 Sprays in nose every 12 hours. As needed for rhinitis        Losartan Potassium (Tab) COZAAR 50 MG Take 1 Tab by mouth every day.        Triamcinolone Acetonide (Cream) KENALOG 0.1 % Apply to back as needed for skin condition/rash        .                 Medicines prescribed today were sent to:     [facility]/PHARMACY #[id number] - [place] - [address] [place] [address]    Phone: [phone] Fax: [phone]    Open 24 Hours?: No    [facility] - [place], [place] - [address] AT PORTAL TO REGISTERED [facility] SITES    [address] [facility] [address]    Phone: [phone] Fax: [phone]    Open 24 Hours?: No      Medication refill instructions:       If your prescription bottle indicates you  have medication refills left, it is not necessary to call your provider’s office. Please contact your pharmacy and they will refill your medication.    If your prescription bottle indicates you do not have any refills left, you may request refills at any time through one of the following ways: The online [employer] system (except Urgent Care), by calling your provider’s office, or by asking your pharmacy to contact your provider’s office with a refill request. Medication refills are processed only during regular business hours and may not be available until the next business day. Your provider may request additional information or to have a follow-up visit with you prior to refilling your medication.   *Please Note: Medication refills are assigned a new Rx number when refilled electronically. Your pharmacy may indicate that no refills were authorized even though a new prescription for the same medication is available at the pharmacy. Please request the medicine by name with the pharmacy before contacting your provider for a refill.        Your To Do List     Future Labs/Procedures Complete By Expires    MA-MAMMO DIAGNOSTIC UNILAT W/MILVIA W/CAD LEFT  As directed 6/2/2018      Other Notes About Your Plan     Colonoscopy Not indicated Dexa  PSA    A1c  Psych-[place] ([phone]) Surgery-[facility] Urology-[facility] Oncology-[place] Ophth-[place]  Cardio - [facility]  11/7/13-Opt referral Dr. Portillo -- 5/5/14 Pt schedule and cancelled, never rescheduled.            [facility] Status: Patient Declined

## 2017-06-02 NOTE — PROGRESS NOTES
"Chief Complaint   Patient presents with   • Cough   • Other     tender left breast       HISTORY OF THE PRESENT ILLNESS: Patient is a 87 y.o. male. Patient comes in with complaint of left breast tenderness and cough.    Cough-symptoms for 2 months. No obvious aggravating factors. Symptoms are improved when he uses a cough drop. Cough is nonproductive. No fever or chills. No wheezing or sputum production.    Breast tenderness-symptoms for 2 or more months. Tenderness is over the nipple and areola on the left only. No trauma. No bruising. No change in medications. No history of breast issues. No discharge from the breasts. No alleviating factors. Aggravated by palpation.       Allergies: Pcn and Sulfa drugs    Current Outpatient Prescriptions Ordered in [facility]   Medication Sig Dispense Refill   • hydrochlorothiazide (HYDRODIURIL) 25 MG Tab TAKE 1 TABLET EVERY DAY 90 Tab 3   • escitalopram (LEXAPRO) 20 MG tablet Take 1 Tab by mouth every day. 90 Tab 3   • losartan (COZAAR) 50 MG Tab Take 1 Tab by mouth every day. 90 Tab 3   • ipratropium (ATROVENT) 0.03 % Solution Spray 2 Sprays in nose every 12 hours. As needed for rhinitis 3 Bottle 3   • triamcinolone acetonide (KENALOG) 0.1 % CREA Apply to back as needed for skin condition/rash 3 Tube 3     No current Epic-ordered facility-administered medications on file.       Past medical history, social history and family history were reviewed from chart today    Review of systems: Per HPI.   All others negative.     Exam: Blood pressure 100/60, pulse 80, temperature 36.3 °C (97.4 °F), resp. rate 14, height 1.727 m (5' 7.99\"), weight 86.818 kg (191 lb 6.4 oz), SpO2 95 %.  General: Well-nourished, well-developed. No change in appearance. No distress.  HEENT: Normocephalic. Nasal cavities are edematous with clear/yellow discharge. Oral cavity is pink and moist. No exudate or lesion.  Pulmonary: Clear.. Normal effort.  Cardiovascular: Regular   Abdomen: Normal appearing. Soft, " nontender, nondistended.   Neurologic: Cranial nerves II through XII are grossly intact, alert and oriented x3  Breast: Bilateral breast are normal in appearance and symmetric bilaterally. Palpation is normal bilaterally. He has tenderness with palpation of the areola and nipple on the left. No discharge from either breast. No skin abnormalities.      Assessment/Plan  1. Cough     2. Breast tenderness  MA-MAMMO DIAGNOSTIC UNILAT W/MILVIA W/CAD LEFT       I suspect his cough is either from allergies, reflux or combination of both. His pulmonary exam is unremarkable. His nasal exam is positive for discharge. I recommended the following:  Allegra 180 mg daily  Flonase one spray in each nostril daily  Zantac 150 mg once daily  Symptoms persist I will order a chest x-ray.    His breast tenderness is probably due to imbalance of testosterone/estrogen with hypertrophy of breast tissue. He is not on any typical medications including digoxin or potassium sparing diuretic that would typically cause these abnormalities. His exam is unremarkable. I recommended the following:  Imaging to rule out underlying disease. Mammogram ordered as above.  If the mammogram is negative consider starting DHEA.

## 2017-07-03 ENCOUNTER — HOSPITAL ENCOUNTER (OUTPATIENT)
Dept: RADIOLOGY | Facility: MEDICAL CENTER | Age: 82
End: 2017-07-03
Attending: INTERNAL MEDICINE
Payer: MEDICARE

## 2017-07-03 DIAGNOSIS — N64.4 BREAST PAIN: ICD-10-CM

## 2017-07-03 PROCEDURE — 76642 ULTRASOUND BREAST LIMITED: CPT | Mod: LT

## 2017-07-03 PROCEDURE — G0204 DX MAMMO INCL CAD BI: HCPCS

## 2017-07-13 DIAGNOSIS — I10 ESSENTIAL HYPERTENSION: ICD-10-CM

## 2017-07-13 RX ORDER — LOSARTAN POTASSIUM 50 MG/1
50 TABLET ORAL DAILY
Qty: 90 TAB | Refills: 3 | Status: SHIPPED | OUTPATIENT
Start: 2017-07-13 | End: 2018-11-02 | Stop reason: SDUPTHER

## 2017-07-31 ENCOUNTER — HOSPITAL ENCOUNTER (OUTPATIENT)
Dept: LAB | Facility: MEDICAL CENTER | Age: 82
End: 2017-07-31
Attending: UROLOGY
Payer: MEDICARE

## 2017-07-31 LAB
PSA SERPL-MCNC: 1.59 NG/ML (ref 0–4)
TESTOST SERPL-MCNC: 103 NG/DL (ref 175–781)

## 2017-07-31 PROCEDURE — 84403 ASSAY OF TOTAL TESTOSTERONE: CPT

## 2017-07-31 PROCEDURE — 36415 COLL VENOUS BLD VENIPUNCTURE: CPT

## 2017-07-31 PROCEDURE — 84153 ASSAY OF PSA TOTAL: CPT

## 2017-08-24 DIAGNOSIS — F33.40 RECURRENT MAJOR DEPRESSIVE DISORDER, IN REMISSION (HCC): ICD-10-CM

## 2017-08-24 RX ORDER — ESCITALOPRAM OXALATE 20 MG/1
20 TABLET ORAL DAILY
Qty: 90 TAB | Refills: 3 | Status: SHIPPED | OUTPATIENT
Start: 2017-08-24 | End: 2019-03-11 | Stop reason: SDUPTHER

## 2017-09-07 ENCOUNTER — OFFICE VISIT (OUTPATIENT)
Dept: INTERNAL MEDICINE | Facility: IMAGING CENTER | Age: 82
End: 2017-09-07
Payer: MEDICARE

## 2017-09-07 VITALS
HEIGHT: 68 IN | HEART RATE: 96 BPM | RESPIRATION RATE: 16 BRPM | TEMPERATURE: 97.4 F | DIASTOLIC BLOOD PRESSURE: 70 MMHG | SYSTOLIC BLOOD PRESSURE: 124 MMHG | BODY MASS INDEX: 29.25 KG/M2 | OXYGEN SATURATION: 97 % | WEIGHT: 193 LBS

## 2017-09-07 DIAGNOSIS — Q80.9 XERODERMA: ICD-10-CM

## 2017-09-07 DIAGNOSIS — M19.042 PRIMARY OSTEOARTHRITIS OF BOTH HANDS: ICD-10-CM

## 2017-09-07 DIAGNOSIS — M19.041 PRIMARY OSTEOARTHRITIS OF BOTH HANDS: ICD-10-CM

## 2017-09-07 PROCEDURE — 99213 OFFICE O/P EST LOW 20 MIN: CPT | Performed by: INTERNAL MEDICINE

## 2017-09-07 NOTE — PROGRESS NOTES
"Chief Complaint   Patient presents with   • Itchy     skin on back       HISTORY OF THE PRESENT ILLNESS: Patient is a 88 y.o. male. Patient comes in for evaluation of itchy skin on the back. Symptoms have been present for more than 10 years. No obvious aggravating factors. It is alleviated by itching. No specific rash. He has tried topical hydrocortisone with no benefit. He swims 3 days weekly in a water volleyball team. Denies use of harsh soaps. He showers 2 or 3 days weekly.    He is also inquiring about alternative gel for use of hand osteoarthritis.       Allergies: Pcn [penicillins] and Sulfa drugs    Current Outpatient Prescriptions Ordered in [facility]   Medication Sig Dispense Refill   • Diclofenac Sodium 1 % Gel Apply 2 g to each hand 4 times daily as needed for arthritis pain. 100 g 3   • escitalopram (LEXAPRO) 20 MG tablet Take 1 Tab by mouth every day. 90 Tab 3   • losartan (COZAAR) 50 MG Tab Take 1 Tab by mouth every day. 90 Tab 3   • hydrochlorothiazide (HYDRODIURIL) 25 MG Tab TAKE 1 TABLET EVERY DAY 90 Tab 3   • ipratropium (ATROVENT) 0.03 % Solution Spray 2 Sprays in nose every 12 hours. As needed for rhinitis 3 Bottle 3   • triamcinolone acetonide (KENALOG) 0.1 % CREA Apply to back as needed for skin condition/rash 3 Tube 3     No current Epic-ordered facility-administered medications on file.        Past medical history, social history and family history were reviewed from chart today    Review of systems: Per HPI.   All others negative.     Exam: Blood pressure 124/70, pulse 96, temperature 36.3 °C (97.4 °F), resp. rate 16, height 1.727 m (5' 8\"), weight 87.5 kg (193 lb), SpO2 97 %.  General: Well-nourished, well-developed. No change in appearance. No distress.  HEENT: Normocephalic.  Pulmonary: Clear. Normal effort.  Cardiovascular: Regular   Abdomen: Normal appearing. Soft, nontender, nondistended.   Neurologic: Cranial nerves II through XII are grossly intact, alert and oriented x3  Skin: Patient " has generalized dry skin on the back. He has a more significant dry patch in the right upper back.      Assessment/Plan  1. Xeroderma     2. Primary osteoarthritis of both hands  Diclofenac Sodium 1 % Gel       Discussed skin care including cool or warm showers with mild soap such as Dove or Jose 2000. I also recommend that he pat dry after. We discussed the importance of hydration. I also recommended topical moisturizer.    He has a history of osteoarthritis in the thumbs. Think it is reasonable for him to try Voltaren gel. He believes that he had a sample from her friend in the past and that it worked well.

## 2017-09-27 ENCOUNTER — NON-PROVIDER VISIT (OUTPATIENT)
Dept: INTERNAL MEDICINE | Facility: IMAGING CENTER | Age: 82
End: 2017-09-27
Payer: MEDICARE

## 2017-09-27 DIAGNOSIS — Z23 NEED FOR INFLUENZA VACCINATION: ICD-10-CM

## 2017-09-27 PROCEDURE — 90662 IIV NO PRSV INCREASED AG IM: CPT | Performed by: INTERNAL MEDICINE

## 2017-09-27 PROCEDURE — G0008 ADMIN INFLUENZA VIRUS VAC: HCPCS | Performed by: INTERNAL MEDICINE

## 2018-01-12 ENCOUNTER — OFFICE VISIT (OUTPATIENT)
Dept: INTERNAL MEDICINE | Facility: IMAGING CENTER | Age: 83
End: 2018-01-12
Payer: MEDICARE

## 2018-01-12 VITALS
BODY MASS INDEX: 29.25 KG/M2 | SYSTOLIC BLOOD PRESSURE: 130 MMHG | WEIGHT: 193 LBS | HEIGHT: 68 IN | RESPIRATION RATE: 14 BRPM | DIASTOLIC BLOOD PRESSURE: 72 MMHG | HEART RATE: 68 BPM | OXYGEN SATURATION: 97 %

## 2018-01-12 DIAGNOSIS — F32.2 SEVERE SINGLE CURRENT EPISODE OF MAJOR DEPRESSIVE DISORDER, WITHOUT PSYCHOTIC FEATURES (HCC): ICD-10-CM

## 2018-01-12 DIAGNOSIS — H61.23 BILATERAL IMPACTED CERUMEN: ICD-10-CM

## 2018-01-12 PROCEDURE — 69210 REMOVE IMPACTED EAR WAX UNI: CPT | Performed by: INTERNAL MEDICINE

## 2018-01-12 PROCEDURE — 99213 OFFICE O/P EST LOW 20 MIN: CPT | Mod: 25 | Performed by: INTERNAL MEDICINE

## 2018-01-16 NOTE — PROGRESS NOTES
"Chief Complaint   Patient presents with   • Cerumen Impaction       HISTORY OF THE PRESENT ILLNESS: Patient is a 88 y.o. male. Patient comes in initially for bilateral cerumen impaction. He is asking about his lavage and removal of the ear wax. He has a history of cerumen impaction which affects his hearing.    His wife encouraged him to discuss his depression. Patient reports that he is feeling very depressed. He has suicidal thoughts. He has a history of depression. He tends to become more reactive when his wife is leaving town and she is planning to go to [place]. He does not have a specific plan.       Allergies: Pcn [penicillins] and Sulfa drugs    Current Outpatient Prescriptions Ordered in [facility]   Medication Sig Dispense Refill   • Diclofenac Sodium 1 % Gel Apply 2 g to each hand 4 times daily as needed for arthritis pain. 100 g 3   • escitalopram (LEXAPRO) 20 MG tablet Take 1 Tab by mouth every day. 90 Tab 3   • losartan (COZAAR) 50 MG Tab Take 1 Tab by mouth every day. 90 Tab 3   • hydrochlorothiazide (HYDRODIURIL) 25 MG Tab TAKE 1 TABLET EVERY DAY 90 Tab 3   • ipratropium (ATROVENT) 0.03 % Solution Spray 2 Sprays in nose every 12 hours. As needed for rhinitis 3 Bottle 3   • triamcinolone acetonide (KENALOG) 0.1 % CREA Apply to back as needed for skin condition/rash 3 Tube 3     No current Epic-ordered facility-administered medications on file.        Past medical history, social history and family history were reviewed from chart today    Review of systems: Per HPI. All others negative.     Exam: Blood pressure 130/72, pulse 68, resp. rate 14, height 1.727 m (5' 7.99\"), weight 87.5 kg (193 lb), SpO2 97 %.  General: Well-nourished, well-developed. No change in appearance. No distress.  HEENT: Normocephalic. Bilateral canals were obstructed with cerumen. These were normal after lavage and manual extraction with curette and forceps  Pulmonary: Clear.. Normal effort.  Cardiovascular: Regular   Abdomen: " Normal appearing. Soft, nontender, nondistended.   Neurologic: Cranial nerves II through XII are grossly intact, alert and oriented x3    It was discussed with the patient that they have a cerumen impaction. The risks and benefits of removal were discussed. Specifically we discussed the benefits of reduced risk of infection, improved visualization of the landmarks and to improve discomfort due to  impaction. Risk were discussed and included but not limited to infection, bleeding, pain and possibly tympanic membrane perforation. A large cerumen plug was removed from bilateral ear canals. The ear was lavaged with water and forceps were used to remove the plug. No complications from some impaction removal. Post cerumen impaction care was discussed. Encouraged regular cleaning but to avoid deep penetration with foreign objects such as a Q-tip.      Assessment/Plan  1. Bilateral impacted cerumen     2. Severe single current episode of major depressive disorder, without psychotic features (CMS-HCC)         Patient underwent bilateral cerumen extraction due to decreased hearing. No complications.    I discussed with the patient I'm more concerned regarding his depression. He has a history of depression which becomes more symptomatic or reactive when his wife leaves town. He states to me that he is considering suicide. He has no specific plan. When I pressed him further he said he would not do it because he is a coward. He is emotional during the visit. His wife will consider canceling her trip. I recommended evaluation at [facility]. I spoke with the intake were negative. His wife reports that she will take him there for evaluation today.

## 2018-03-22 DIAGNOSIS — D64.9 ANEMIA, UNSPECIFIED TYPE: ICD-10-CM

## 2018-03-22 DIAGNOSIS — E78.00 PURE HYPERCHOLESTEROLEMIA: ICD-10-CM

## 2018-03-22 DIAGNOSIS — I10 ESSENTIAL HYPERTENSION: ICD-10-CM

## 2018-05-09 ENCOUNTER — OFFICE VISIT (OUTPATIENT)
Dept: INTERNAL MEDICINE | Facility: IMAGING CENTER | Age: 83
End: 2018-05-09
Payer: MEDICARE

## 2018-05-09 VITALS
HEIGHT: 68 IN | HEART RATE: 93 BPM | RESPIRATION RATE: 16 BRPM | TEMPERATURE: 96.8 F | OXYGEN SATURATION: 96 % | BODY MASS INDEX: 28.34 KG/M2 | DIASTOLIC BLOOD PRESSURE: 70 MMHG | SYSTOLIC BLOOD PRESSURE: 120 MMHG | WEIGHT: 187 LBS

## 2018-05-09 DIAGNOSIS — M75.41 IMPINGEMENT SYNDROME OF RIGHT SHOULDER: ICD-10-CM

## 2018-05-09 DIAGNOSIS — S29.012A UPPER BACK STRAIN, INITIAL ENCOUNTER: ICD-10-CM

## 2018-05-09 DIAGNOSIS — R49.0 HOARSE VOICE QUALITY: ICD-10-CM

## 2018-05-09 PROCEDURE — 99213 OFFICE O/P EST LOW 20 MIN: CPT | Performed by: INTERNAL MEDICINE
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
## 2018-05-16 DIAGNOSIS — I10 ESSENTIAL HYPERTENSION: ICD-10-CM

## 2018-05-17 RX ORDER — HYDROCHLOROTHIAZIDE 25 MG/1
TABLET ORAL
Qty: 90 TAB | Refills: 2 | Status: SHIPPED | OUTPATIENT
Start: 2018-05-17 | End: 2018-11-19

## 2018-05-25 DIAGNOSIS — F32.1 CURRENT MODERATE EPISODE OF MAJOR DEPRESSIVE DISORDER WITHOUT PRIOR EPISODE (HCC): ICD-10-CM

## 2018-05-25 DIAGNOSIS — F03.90 DEMENTIA WITHOUT BEHAVIORAL DISTURBANCE, UNSPECIFIED DEMENTIA TYPE: ICD-10-CM

## 2018-05-30 ENCOUNTER — HOSPITAL ENCOUNTER (OUTPATIENT)
Facility: MEDICAL CENTER | Age: 83
End: 2018-05-30
Attending: INTERNAL MEDICINE
Payer: MEDICARE

## 2018-05-30 ENCOUNTER — NON-PROVIDER VISIT (OUTPATIENT)
Dept: INTERNAL MEDICINE | Facility: IMAGING CENTER | Age: 83
End: 2018-05-30
Payer: MEDICARE

## 2018-05-30 DIAGNOSIS — D64.9 ANEMIA, UNSPECIFIED TYPE: ICD-10-CM

## 2018-05-30 DIAGNOSIS — I10 ESSENTIAL HYPERTENSION: ICD-10-CM

## 2018-05-30 DIAGNOSIS — E78.00 PURE HYPERCHOLESTEROLEMIA: ICD-10-CM

## 2018-05-30 LAB
BASOPHILS # BLD AUTO: 0.2 % (ref 0–1.8)
BASOPHILS # BLD: 0.02 K/UL (ref 0–0.12)
EOSINOPHIL # BLD AUTO: 0.37 K/UL (ref 0–0.51)
EOSINOPHIL NFR BLD: 4.3 % (ref 0–6.9)
ERYTHROCYTE [DISTWIDTH] IN BLOOD BY AUTOMATED COUNT: 51.8 FL (ref 35.9–50)
HCT VFR BLD AUTO: 39.9 % (ref 42–52)
HGB BLD-MCNC: 12.9 G/DL (ref 14–18)
IMM GRANULOCYTES # BLD AUTO: 0.03 K/UL (ref 0–0.11)
IMM GRANULOCYTES NFR BLD AUTO: 0.3 % (ref 0–0.9)
LYMPHOCYTES # BLD AUTO: 1.03 K/UL (ref 1–4.8)
LYMPHOCYTES NFR BLD: 11.9 % (ref 22–41)
MCH RBC QN AUTO: 30.3 PG (ref 27–33)
MCHC RBC AUTO-ENTMCNC: 32.3 G/DL (ref 33.7–35.3)
MCV RBC AUTO: 93.7 FL (ref 81.4–97.8)
MONOCYTES # BLD AUTO: 0.72 K/UL (ref 0–0.85)
MONOCYTES NFR BLD AUTO: 8.3 % (ref 0–13.4)
NEUTROPHILS # BLD AUTO: 6.51 K/UL (ref 1.82–7.42)
NEUTROPHILS NFR BLD: 75 % (ref 44–72)
NRBC # BLD AUTO: 0 K/UL
NRBC BLD-RTO: 0 /100 WBC
PLATELET # BLD AUTO: 254 K/UL (ref 164–446)
PMV BLD AUTO: 12.4 FL (ref 9–12.9)
RBC # BLD AUTO: 4.26 M/UL (ref 4.7–6.1)
WBC # BLD AUTO: 8.7 K/UL (ref 4.8–10.8)

## 2018-05-30 PROCEDURE — 80061 LIPID PANEL: CPT

## 2018-05-30 PROCEDURE — 82043 UR ALBUMIN QUANTITATIVE: CPT

## 2018-05-30 PROCEDURE — 81003 URINALYSIS AUTO W/O SCOPE: CPT

## 2018-05-30 PROCEDURE — 80053 COMPREHEN METABOLIC PANEL: CPT

## 2018-05-30 PROCEDURE — 82570 ASSAY OF URINE CREATININE: CPT

## 2018-05-30 PROCEDURE — 85025 COMPLETE CBC W/AUTO DIFF WBC: CPT

## 2018-05-31 LAB
ALBUMIN SERPL BCP-MCNC: 3.7 G/DL (ref 3.2–4.9)
ALBUMIN/GLOB SERPL: 1.3 G/DL
ALP SERPL-CCNC: 39 U/L (ref 30–99)
ALT SERPL-CCNC: 19 U/L (ref 2–50)
ANION GAP SERPL CALC-SCNC: 8 MMOL/L (ref 0–11.9)
APPEARANCE UR: CLEAR
AST SERPL-CCNC: 19 U/L (ref 12–45)
BILIRUB SERPL-MCNC: 0.5 MG/DL (ref 0.1–1.5)
BILIRUB UR QL STRIP.AUTO: NEGATIVE
BUN SERPL-MCNC: 21 MG/DL (ref 8–22)
CALCIUM SERPL-MCNC: 9.1 MG/DL (ref 8.5–10.5)
CHLORIDE SERPL-SCNC: 106 MMOL/L (ref 96–112)
CHOLEST SERPL-MCNC: 161 MG/DL (ref 100–199)
CO2 SERPL-SCNC: 25 MMOL/L (ref 20–33)
COLOR UR: YELLOW
CREAT SERPL-MCNC: 1.22 MG/DL (ref 0.5–1.4)
CREAT UR-MCNC: 97 MG/DL
GLOBULIN SER CALC-MCNC: 2.9 G/DL (ref 1.9–3.5)
GLUCOSE SERPL-MCNC: 83 MG/DL (ref 65–99)
GLUCOSE UR STRIP.AUTO-MCNC: NEGATIVE MG/DL
HDLC SERPL-MCNC: 53 MG/DL
KETONES UR STRIP.AUTO-MCNC: NEGATIVE MG/DL
LDLC SERPL CALC-MCNC: 96 MG/DL
LEUKOCYTE ESTERASE UR QL STRIP.AUTO: NEGATIVE
MICRO URNS: NORMAL
MICROALBUMIN UR-MCNC: <0.7 MG/DL
MICROALBUMIN/CREAT UR: NORMAL MG/G (ref 0–30)
NITRITE UR QL STRIP.AUTO: NEGATIVE
PH UR STRIP.AUTO: 5 [PH]
POTASSIUM SERPL-SCNC: 4 MMOL/L (ref 3.6–5.5)
PROT SERPL-MCNC: 6.6 G/DL (ref 6–8.2)
PROT UR QL STRIP: NEGATIVE MG/DL
RBC UR QL AUTO: NEGATIVE
SODIUM SERPL-SCNC: 139 MMOL/L (ref 135–145)
SP GR UR STRIP.AUTO: 1.02
TRIGL SERPL-MCNC: 60 MG/DL (ref 0–149)
UROBILINOGEN UR STRIP.AUTO-MCNC: 0.2 MG/DL

## 2018-06-04 ENCOUNTER — OFFICE VISIT (OUTPATIENT)
Dept: INTERNAL MEDICINE | Facility: IMAGING CENTER | Age: 83
End: 2018-06-04
Payer: MEDICARE

## 2018-06-04 VITALS
OXYGEN SATURATION: 94 % | HEART RATE: 93 BPM | TEMPERATURE: 98.2 F | RESPIRATION RATE: 16 BRPM | BODY MASS INDEX: 29.25 KG/M2 | DIASTOLIC BLOOD PRESSURE: 72 MMHG | HEIGHT: 68 IN | SYSTOLIC BLOOD PRESSURE: 110 MMHG | WEIGHT: 193 LBS

## 2018-06-04 DIAGNOSIS — Z95.2 STATUS POST AORTIC VALVE REPLACEMENT: ICD-10-CM

## 2018-06-04 DIAGNOSIS — Z85.831 HX OF SARCOMA OF SOFT TISSUE: ICD-10-CM

## 2018-06-04 DIAGNOSIS — G31.84 MILD COGNITIVE IMPAIRMENT: ICD-10-CM

## 2018-06-04 DIAGNOSIS — Z95.0 STATUS POST CARDIAC PACEMAKER PROCEDURE: ICD-10-CM

## 2018-06-04 DIAGNOSIS — R97.20 ELEVATED PSA: ICD-10-CM

## 2018-06-04 DIAGNOSIS — F32.1 CURRENT MODERATE EPISODE OF MAJOR DEPRESSIVE DISORDER WITHOUT PRIOR EPISODE (HCC): ICD-10-CM

## 2018-06-04 DIAGNOSIS — N18.30 CKD (CHRONIC KIDNEY DISEASE) STAGE 3, GFR 30-59 ML/MIN (HCC): ICD-10-CM

## 2018-06-04 DIAGNOSIS — E34.9 HYPOTESTOSTERONEMIA: ICD-10-CM

## 2018-06-04 DIAGNOSIS — R10.9 FLANK PAIN: ICD-10-CM

## 2018-06-04 DIAGNOSIS — D64.9 ANEMIA, UNSPECIFIED TYPE: ICD-10-CM

## 2018-06-04 DIAGNOSIS — F03.90 DEMENTIA WITHOUT BEHAVIORAL DISTURBANCE, UNSPECIFIED DEMENTIA TYPE: ICD-10-CM

## 2018-06-04 DIAGNOSIS — Z00.00 MEDICARE ANNUAL WELLNESS VISIT, SUBSEQUENT: ICD-10-CM

## 2018-06-04 DIAGNOSIS — Z12.11 SCREENING FOR COLON CANCER: ICD-10-CM

## 2018-06-04 DIAGNOSIS — I25.10 CORONARY ARTERY DISEASE INVOLVING NATIVE CORONARY ARTERY OF NATIVE HEART WITHOUT ANGINA PECTORIS: ICD-10-CM

## 2018-06-04 PROCEDURE — G0438 PPPS, INITIAL VISIT: HCPCS | Performed by: INTERNAL MEDICINE

## 2018-06-04 ASSESSMENT — PATIENT HEALTH QUESTIONNAIRE - PHQ9
SUM OF ALL RESPONSES TO PHQ QUESTIONS 1-9: 7
CLINICAL INTERPRETATION OF PHQ2 SCORE: 2
5. POOR APPETITE OR OVEREATING: 1 - SEVERAL DAYS

## 2018-06-04 ASSESSMENT — ACTIVITIES OF DAILY LIVING (ADL): BATHING_REQUIRES_ASSISTANCE: 0

## 2018-06-04 ASSESSMENT — ENCOUNTER SYMPTOMS: GENERAL WELL-BEING: GOOD

## 2018-06-04 NOTE — PROGRESS NOTES
88 y.o. male presents for the followin. Medicare annual wellness visit, subsequent  Patient comes in for annual physical, health risk assessment and review of laboratory. His been feeling well. He remains active playing water volleyball. He lives with his wife. He is able to perform most activities of daily living but he no longer cooks. He does not drive. Denies balance issues. He has moderate cognitive and memory loss from dementia. He has a history of depression with symptoms that wax and wane.    2. Dementia without behavioral disturbance, unspecified dementia type  Patient with probable vascular dementia. His progress has been slow but he continues to deteriorate. He no longer drives. He no longer cooks. When his wife travels to [facility] of the family has arranged for a caregiver to be present. Is previously tried Namenda but discontinued because of side effects. He was also tried on Aricept but it was prohibitively expensive. He has not seen neurology in over a year. He was not a candidate for MRI because of pacemaker. He had an MRI in  that showed age-appropriate atrophy and microvascular ischemic changes. His B-12 level was normal. His thyroid function test is normal. He had a head CT in 2016. The ventricles were prominent but no specific mention of hydrocephalus. This was performed because of head trauma.    3. Mild cognitive impairment  As above.    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 There is been no center recurrence. He complains of some ongoing flank pain. His original pain was back pain.    5. Status post cardiac pacemaker procedure  Stable. Pacemaker appears to be functioning normally. This is followed by Dr. Cullen    6. Status post aortic valve replacement  Post TAVR. Some stenosis seen on follow-up echocardiogram. He denies orthopnea or PND. No lower extremity edema    7. Coronary  artery disease involving native coronary artery of native heart without angina pectoris  Post stent placement. No chest pain, angina or equivalent.    8. Elevated PSA  History of elevated PSA associated with testosterone supplementation. His PSA returned to normal with discontinuing. It was presumably due to BPH. He experiences both daytime and nighttime symptoms. They have discussed TURP with the patient but he has not actually been further workup or medications at this time. He's been discharged by urology.    9. Current moderate episode of major depressive disorder without prior episode (HCC)  This is been an ongoing issue for years. Symptoms wax and wane. He is currently on Lexapro 20 mg. He's been referred to Dr. Vasquez, psychiatry. He has yet to establish care.    10. Hypotestosteronemia  Patient is no longer on treatment. His last testosterone level was approximately 100. It was discontinued because of elevated PSA.    11. Anemia, unspecified type  He has a history of chronic, mild anemia. His last readings have been slightly worse. His hemoglobin is <13. No melena or rectal bleeding. He has mild renal insufficiency.    12. CKD (chronic kidney disease) stage 3, GFR 30-59 ml/min  Stable. Discussed importance of minimizing NSAID use.      Annual Wellness Visit/Health Risk Assessment:    Past medical:  Past Medical History:   Diagnosis Date   • Arthritis    • CAD (coronary artery disease)     stents   • CKD (chronic kidney disease) stage 3, GFR 30-59 ml/min 6/4/2018   • Depression    • Erectile dysfunction    • Heart burn    • Heart valve disease    • Hip replacement arthroplasty bilateral   • Hyperlipemia    • Hypertension    • Hypogonadism    • Indigestion    • Pain 12-31-12    left flank, 7/10   • Sarcoma (HCC)    • VENTRAL HERNIA Repaired       Past surgical:  Past Surgical History:   Procedure Laterality Date   • LAPAROSCOPY  1/8/2013    Performed by Rodrick Ruiz M.D. at SURGERY [facility]   •  MASS EXCISION GENERAL  1/8/2013    Performed by Rodrick Ruiz M.D. at SURGERY [facility]   • SHOULDER ARTHROPLASTY TOTAL  2000   • CORONARY ARTERY BYPASS, 3     • HIP ARTHROPLASTY TOTAL      bilateral   • VENTRAL HERNIA REPAIR         Family history: relating to possible risk factors for your patient  History reviewed. No pertinent family history.    Current Providers (including home care/DME’s):   Colonoscopy Not indicated Dexa  PSA    A1c  [facility] [phone]) S[facility]  Ophth-[facility] (does not wish to return)     Cardio - [facility]      Patient Care Team:  Phil Mcgee M.D. as PCP - General      Medications:   Current Outpatient Prescriptions Ordered in [facility]   Medication Sig Dispense Refill   • hydroCHLOROthiazide (HYDRODIURIL) 25 MG Tab TAKE 1 TABLET EVERY DAY 90 Tab 2   • Diclofenac Sodium 1 % Gel Apply 2 g to each hand 4 times daily as needed for arthritis pain. 100 g 3   • escitalopram (LEXAPRO) 20 MG tablet Take 1 Tab by mouth every day. 90 Tab 3   • losartan (COZAAR) 50 MG Tab Take 1 Tab by mouth every day. 90 Tab 3   • ipratropium (ATROVENT) 0.03 % Solution Spray 2 Sprays in nose every 12 hours. As needed for rhinitis 3 Bottle 3     No current Epic-ordered facility-administered medications on file.        Supplements (calcium/vitamins): if not lisited in medications    Chief Complaint   Patient presents with   • Annual Exam         HPI:  Anthony Coleman is a 88 y.o. here for Medicare Annual Wellness Visit     Patient Active Problem List    Diagnosis Date Noted   • CKD (chronic kidney disease) stage 3, GFR 30-59 ml/min 06/04/2018   • Hx of sarcoma of soft tissue 07/21/2015   • Status post cardiac pacemaker procedure 02/25/2015   • Status post aortic valve replacement 02/25/2015   • Anesthesia of skin 03/12/2013   • PVC (premature ventricular contraction) 04/12/2012   • CAD (coronary artery disease) 04/12/2012   • Elevated PSA 04/12/2012    • Aortic stenosis 01/12/2012   • Hypogonadism male 12/16/2009   • Depression    • Erectile dysfunction    • Hyperlipemia    • Hypertension    • Hypogonadism        Current Outpatient Prescriptions   Medication Sig Dispense Refill   • hydroCHLOROthiazide (HYDRODIURIL) 25 MG Tab TAKE 1 TABLET EVERY DAY 90 Tab 2   • Diclofenac Sodium 1 % Gel Apply 2 g to each hand 4 times daily as needed for arthritis pain. 100 g 3   • escitalopram (LEXAPRO) 20 MG tablet Take 1 Tab by mouth every day. 90 Tab 3   • losartan (COZAAR) 50 MG Tab Take 1 Tab by mouth every day. 90 Tab 3   • ipratropium (ATROVENT) 0.03 % Solution Spray 2 Sprays in nose every 12 hours. As needed for rhinitis 3 Bottle 3     No current facility-administered medications for this visit.             Current supplements as per medication list.       Allergies: Pcn [penicillins] and Sulfa drugs    Current social contact/activities: Primarily social with family members.    He  reports that he has never smoked. He has never used smokeless tobacco. He reports that he drinks about 7.0 oz of alcohol per week . He reports that he does not use drugs.  Counseling given: Not Answered        DPA/Advanced Directive:  Patient has Advanced Directive on file.       ROS:    Gait: Uses no assistive device   Ostomy: no   Other tubes: no   Amputations: no   Chronic oxygen use: no   Last eye exam: Over one year ago  : Denies any urinary leakage during the last 6 months incontinence.       Screening:  Recommended Shingrix. Recommended physical immunoassay  Depression Screening    Little interest or pleasure in doing things?  1 - several days  Feeling down, depressed , or hopeless? 1 - several days  Trouble falling or staying asleep, or sleeping too much?  1 - several days  Feeling tired or having little energy?  1 - several days  Poor appetite or overeating?  1 - several days  Feeling bad about yourself - or that you are a failure or have let yourself or your family down? 1 -  several days  Trouble concentrating on things, such as reading the newspaper or watching television? 1 - several days  Moving or speaking so slowly that other people could have noticed.  Or the opposite - being so fidgety or restless that you have been moving around a lot more than usual?  0 - not at all  Thoughts that you would be better off dead, or of hurting yourself?  0 - not at all  Patient Health Questionnaire Score: 7    If depressive symptoms identified deferred to follow up visit unless specifically addressed in assessment and plan.    Interpretation of PHQ-9 Total Score   Score Severity   1-4 No Depression   5-9 Mild Depression   10-14 Moderate Depression   15-19 Moderately Severe Depression   20-27 Severe Depression      Screening for Cognitive Impairment    Three Minute Recall (leader, season, table) 0/3    Luis clock face with all 12 numbers and set the hands to show 10 past 11.  No    Cognitive concerns identified deferred for follow up unless specifically addressed in assessment and plan.    Fall Risk Assessment    Has the patient had two or more falls in the last year or any fall with injury in the last year?  No    Safety Assessment    Throw rugs on floor.  Yes  Handrails on all stairs.  No  Good lighting in all hallways.  Yes  Difficulty hearing.  No  Patient counseled about all safety risks that were identified.    Functional Assessment ADLs    Are there any barriers preventing you from cooking for yourself or meeting nutritional needs?  No.    Are there any barriers preventing you from driving safely or obtaining transportation?  No.    Are there any barriers preventing you from using a telephone or calling for help?  No.    Are there any barriers preventing you from shopping?  No.    Are there any barriers preventing you from taking care of your own finances?  No.    Are there any barriers preventing you from managing your medications?  No.    Are there any barriers preventing you from  "showering, bathing or dressing yourself? No.    Are you currently engaging in any exercise or physical activity?  Yes.  Water volleyball 2-3 x week  What is your perception of your health?  Good.      Health Maintenance Summary                Annual Wellness Visit Overdue 6[birth year]24[birth year]29     IMM DTaP/Tdap/Td Vaccine Next Due 10/30/2026      Done 10/30/2016 Imm Admin: Tdap Vaccine     Patient has more history with this topic...          Patient Care Team:  Phil Mcgee M.D. as PCP - General      Social History   Substance Use Topics   • Smoking status: Never Smoker   • Smokeless tobacco: Never Used   • Alcohol use 7.0 oz/week     14 drink(s) per week      Comment: daily, 2 glasses of wine     History reviewed. No pertinent family history.  He  has a past medical history of Arthritis; CAD (coronary artery disease); CKD (chronic kidney disease) stage 3, GFR 30-59 ml/min (6/4/2018); Depression; Erectile dysfunction; Heart burn; Heart valve disease; Hip replacement arthroplasty (bilateral); Hyperlipemia; Hypertension; Hypogonadism; Indigestion; Pain (12-31-12); Sarcoma (HCC); and VENTRAL HERNIA (Repaired).   Past Surgical History:   Procedure Laterality Date   • LAPAROSCOPY  1/8/2013    Performed by Rodrick Ruiz M.D. at SURGERY [facility]   • MASS EXCISION GENERAL  1/8/2013    Performed by Rodrick Ruiz M.D. at SURGERY [facility]   • SHOULDER ARTHROPLASTY TOTAL  2000   • CORONARY ARTERY BYPASS, 3     • HIP ARTHROPLASTY TOTAL      bilateral   • VENTRAL HERNIA REPAIR         Exam:     Blood pressure 110/72, pulse 93, temperature 36.8 °C (98.2 °F), resp. rate 16, height 1.727 m (5' 8\"), weight 87.5 kg (193 lb), SpO2 94 %. Body mass index is 29.35 kg/m².    Hearing poor.    Dentition fair  Alert, oriented in no acute distress.  Eye contact is good, speech goal directed, affect calm  General: Overweight. Well-appearing. Well-developed. No signs of distress.  HEENT: Grossly normal. Oral cavity is pink and " moist.  Neck: Supple without JVD or bruit.  Pulmonary: Clear with good breath sounds. Normal effort.  Cardiovascular: Regular. Carotid and radial pulses are intact.  Abdomen: Soft, nontender, nondistended. Spleen and liver are not enlarged.  Neurologic: Cranial nerves II through XII are grossly normal, alert and oriented x3      Assessment and Plan. The following treatment and monitoring plan is recommended:    1. Medicare annual wellness visit, subsequent  Recommended Shingrix. Recommended fit.    2. Dementia without behavioral disturbance, unspecified dementia type  No obvious reversible cause of disease. Intolerant of Namenda and Aricept. Encouraged regular physical and mental activity    3. 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    5. Status post cardiac pacemaker procedure  Stable. Followed by cardiology    6. Status post aortic valve replacement  Stable. Followed by cardiology    7. Coronary artery disease involving native coronary artery of native heart without angina pectoris  Stable. Followed by cardiology. Patient would benefit from 81 mg aspirin.    8. Elevated PSA  Improved off of testosterone supplementation.    9. Current moderate episode of major depressive disorder without prior episode (HCC)  Follow-up with psychiatry as scheduled. At this time continue Lexapro.    10. Hypotestosteronemia  Remain off testosterone due to affect on PSA    11. Anemia, unspecified type  Recommended fecal immunoassay. Continue to monitor for changes.    12. CKD (chronic kidney disease) stage 3, GFR 30-59 ml/min  Stable. Recommend avoiding anti-inflammatories'    Services suggested: No services needed at this time   I recommended the patient not be at home alone due to his dementia and comorbid depression.  Health Care Screening: Age-appropriate preventive services Medicare covers discussed today and ordered if indicated.  Referrals offered:  Community-based lifestyle interventions to reduce health risks and promote self-management and wellness, fall prevention, nutrition, physical activity, tobacco-use cessation, weight loss, and mental health services as per orders if indicated.    Discussion today about general wellness and lifestyle habits:    · Prevent falls and reduce trip hazards; Cautioned about securing or removing rugs.  · Have a working fire alarm and carbon monoxide detector;   · Engage in regular physical activity and social activities       Follow-up: Treatment follow-up

## 2018-06-13 ENCOUNTER — HOSPITAL ENCOUNTER (OUTPATIENT)
Dept: RADIOLOGY | Facility: MEDICAL CENTER | Age: 83
End: 2018-06-13
Attending: INTERNAL MEDICINE
Payer: MEDICARE

## 2018-06-13 DIAGNOSIS — Z85.831 HX OF SARCOMA OF SOFT TISSUE: ICD-10-CM

## 2018-06-13 DIAGNOSIS — R10.9 FLANK PAIN: ICD-10-CM

## 2018-06-13 PROCEDURE — 74177 CT ABD & PELVIS W/CONTRAST: CPT

## 2018-06-13 PROCEDURE — 700117 HCHG RX CONTRAST REV CODE 255: Performed by: INTERNAL MEDICINE

## 2018-06-13 RX ADMIN — IOHEXOL 100 ML: 350 INJECTION, SOLUTION INTRAVENOUS at 14:02

## 2018-06-14 ENCOUNTER — TELEPHONE (OUTPATIENT)
Dept: INTERNAL MEDICINE | Facility: IMAGING CENTER | Age: 83
End: 2018-06-14

## 2018-07-19 ENCOUNTER — TELEPHONE (OUTPATIENT)
Dept: INTERNAL MEDICINE | Facility: IMAGING CENTER | Age: 83
End: 2018-07-19

## 2018-07-20 ENCOUNTER — OFFICE VISIT (OUTPATIENT)
Dept: INTERNAL MEDICINE | Facility: IMAGING CENTER | Age: 83
End: 2018-07-20
Payer: MEDICARE

## 2018-07-20 VITALS
RESPIRATION RATE: 16 BRPM | DIASTOLIC BLOOD PRESSURE: 60 MMHG | HEART RATE: 102 BPM | BODY MASS INDEX: 28.49 KG/M2 | OXYGEN SATURATION: 98 % | SYSTOLIC BLOOD PRESSURE: 110 MMHG | HEIGHT: 68 IN | WEIGHT: 188 LBS | TEMPERATURE: 97.7 F

## 2018-07-20 DIAGNOSIS — R14.3 FLATULENCE: ICD-10-CM

## 2018-07-20 DIAGNOSIS — K21.00 REFLUX ESOPHAGITIS: ICD-10-CM

## 2018-07-20 DIAGNOSIS — R10.13 EPIGASTRIC PAIN: ICD-10-CM

## 2018-07-20 PROCEDURE — 99213 OFFICE O/P EST LOW 20 MIN: CPT | Performed by: INTERNAL MEDICINE

## 2018-07-20 NOTE — PROGRESS NOTES
"Chief Complaint   Patient presents with   • Abdominal Pain       HISTORY OF THE PRESENT ILLNESS: Patient is a 89 y.o. male.     Patient comes in complaining of epigastric and left upper quadrant discomfort.  He denies pain but reports he has been having an uneasy feeling with bloating, belching and flatulence.  He believes the symptoms started after an episode of diarrhea.  He had a single episode which he treated with Imodium.  No melena.  No rectal bleeding.  His current symptoms do not change with eating or drinking.  He has tried no medications to treat.  He had a CAT scan in June 2018.  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  He reports that he is having normal, regular bowel movements.    Impression         1. No new or suspicious mass.    2. Previous 1.3 cm mass in the medial left perinephric fat demonstrates more calcification.    3. Sigmoid diverticulosis.           Allergies: Pcn [penicillins] and Sulfa drugs    Current Outpatient Prescriptions Ordered in [facility]   Medication Sig Dispense Refill   • hydroCHLOROthiazide (HYDRODIURIL) 25 MG Tab TAKE 1 TABLET EVERY DAY 90 Tab 2   • Diclofenac Sodium 1 % Gel Apply 2 g to each hand 4 times daily as needed for arthritis pain. 100 g 3   • escitalopram (LEXAPRO) 20 MG tablet Take 1 Tab by mouth every day. 90 Tab 3   • losartan (COZAAR) 50 MG Tab Take 1 Tab by mouth every day. 90 Tab 3   • ipratropium (ATROVENT) 0.03 % Solution Spray 2 Sprays in nose every 12 hours. As needed for rhinitis 3 Bottle 3     No current Epic-ordered facility-administered medications on file.        Past medical history, social history and family history were reviewed from chart today    Review of systems: Per HPI.    Denies headache, chest pain, fever, chills, diarrhea, constipation, palpitations,    All others negative.     Exam: Blood pressure 110/60, pulse (!) 102, temperature 36.5 °C (97.7 °F), resp. rate 16, height 1.727 m (5' 8\"), weight 85.3 kg (188 lb), SpO2 98 " %.  General: Well-appearing. Well-developed. No signs of distress.  HEENT: Grossly normal. Oral cavity is pink and moist.  Neck: Supple without JVD or bruit.  Pulmonary: Clear with good breath sounds. Normal effort.  Cardiovascular: Regular. Carotid and radial pulses are intact.  Abdomen: Obese, soft, normal bowel sounds.  Liver and spleen are grossly normal in size.  There is mild epigastric discomfort which extends towards the left  Neurologic: Cranial nerves II through XII are grossly normal, alert and oriented x3      Assessment/Plan  1. Epigastric pain     2. Reflux esophagitis     3. Flatulence           Patient with mild tenderness in the epigastric extending towards the left but not truly in the left upper quadrant.  I suspect gastritis.  I recommended a trial of Nexium.  Samples were given for a 6 day course of medication.  If his symptoms do not improve/resolve I recommended that he follow-up for blood tests and imaging.    Labs-none  Imaging-none  Medications-samples of Nexium

## 2018-08-22 ENCOUNTER — HOSPITAL ENCOUNTER (OUTPATIENT)
Dept: HOSPITAL 8 - CFH | Age: 83
Discharge: HOME | End: 2018-08-22
Attending: INTERNAL MEDICINE
Payer: MEDICARE

## 2018-08-22 DIAGNOSIS — I34.8: Primary | ICD-10-CM

## 2018-08-22 DIAGNOSIS — Z95.2: ICD-10-CM

## 2018-08-22 PROCEDURE — 93306 TTE W/DOPPLER COMPLETE: CPT

## 2018-09-11 ENCOUNTER — TELEPHONE (OUTPATIENT)
Dept: INTERNAL MEDICINE | Facility: IMAGING CENTER | Age: 83
End: 2018-09-11

## 2018-09-21 ENCOUNTER — NON-PROVIDER VISIT (OUTPATIENT)
Dept: INTERNAL MEDICINE | Facility: IMAGING CENTER | Age: 83
End: 2018-09-21
Payer: MEDICARE

## 2018-09-21 DIAGNOSIS — Z23 NEED FOR INFLUENZA VACCINATION: ICD-10-CM

## 2018-09-21 PROCEDURE — 90662 IIV NO PRSV INCREASED AG IM: CPT | Performed by: INTERNAL MEDICINE

## 2018-09-21 PROCEDURE — G0008 ADMIN INFLUENZA VIRUS VAC: HCPCS | Performed by: INTERNAL MEDICINE

## 2018-10-30 ENCOUNTER — OFFICE VISIT (OUTPATIENT)
Dept: INTERNAL MEDICINE | Facility: IMAGING CENTER | Age: 83
End: 2018-10-30
Payer: MEDICARE

## 2018-10-30 VITALS
SYSTOLIC BLOOD PRESSURE: 122 MMHG | HEIGHT: 68 IN | DIASTOLIC BLOOD PRESSURE: 76 MMHG | BODY MASS INDEX: 29.4 KG/M2 | OXYGEN SATURATION: 96 % | RESPIRATION RATE: 16 BRPM | HEART RATE: 88 BPM | TEMPERATURE: 97.4 F | WEIGHT: 194 LBS

## 2018-10-30 DIAGNOSIS — G30.1 LATE ONSET ALZHEIMER'S DISEASE WITHOUT BEHAVIORAL DISTURBANCE (HCC): ICD-10-CM

## 2018-10-30 DIAGNOSIS — R68.2 DRY MOUTH: ICD-10-CM

## 2018-10-30 DIAGNOSIS — F02.80 LATE ONSET ALZHEIMER'S DISEASE WITHOUT BEHAVIORAL DISTURBANCE (HCC): ICD-10-CM

## 2018-10-30 DIAGNOSIS — H61.23 BILATERAL HEARING LOSS DUE TO CERUMEN IMPACTION: ICD-10-CM

## 2018-10-30 DIAGNOSIS — I10 ESSENTIAL HYPERTENSION: ICD-10-CM

## 2018-10-30 PROCEDURE — 99214 OFFICE O/P EST MOD 30 MIN: CPT | Performed by: INTERNAL MEDICINE

## 2018-10-30 NOTE — LETTER
October 30, 2018        Anthony Coleman  [address] [address]        Dear Anthony:    I would like to make the following changes in your medications:    1. Discontinue hydrochlorothiazide..  2. Change losartan 50 mg from once daily to twice daily (morning and evening).  I will send in a new prescription.    I also have listed:  Nexium 20 mg daily  Lexapro 20 mg daily    If you have any questions or concerns, please don't hesitate to call.        Sincerely,        Phil Mcgee M.D.

## 2018-10-30 NOTE — PROGRESS NOTES
"Chief complaint: Dry mouth      HISTORY OF THE PRESENT ILLNESS: Patient is a 89 y.o. male.     Patient comes in complaining of dry mouth.  He is uncertain how long this symptom has been occurring.  His wife admits that he has been complaining about dry mouth for some time.  He has moderate to advanced dementia.  He is no longer driving.  He has been intolerant of Aricept and Namenda.  He is still able to perform all activities of daily living.  He continues to seen with [employer].      Allergies: Pcn [penicillins] and Sulfa drugs    Current Outpatient Prescriptions Ordered in [facility]   Medication Sig Dispense Refill   • esomeprazole (NEXIUM) 20 MG capsule Take 1 Cap by mouth every morning before breakfast. 90 Cap 3   • hydroCHLOROthiazide (HYDRODIURIL) 25 MG Tab TAKE 1 TABLET EVERY DAY 90 Tab 2   • escitalopram (LEXAPRO) 20 MG tablet Take 1 Tab by mouth every day. 90 Tab 3   • losartan (COZAAR) 50 MG Tab Take 1 Tab by mouth every day. 90 Tab 3   • Diclofenac Sodium 1 % Gel Apply 2 g to each hand 4 times daily as needed for arthritis pain. 100 g 3   • ipratropium (ATROVENT) 0.03 % Solution Spray 2 Sprays in nose every 12 hours. As needed for rhinitis 3 Bottle 3     No current Epic-ordered facility-administered medications on file.        Past medical history, social history and family history were reviewed from chart today    Review of systems: Per HPI.    Denies headache, chest pain, fever, chills, diarrhea, constipation, abdominal pain, palpitations, depression   All others negative.     Exam: Blood pressure 122/76, pulse 88, temperature 36.3 °C (97.4 °F), temperature source Temporal, resp. rate 16, height 1.727 m (5' 8\"), weight 88 kg (194 lb), SpO2 96 %.  General: Well-appearing. Well-developed. No signs of distress.  HEENT: Grossly normal. Oral cavity is pink and moist.  Neck: Supple without JVD or bruit.  Pulmonary: Clear with good breath sounds. Normal effort.  Cardiovascular: Regular. Carotid and " radial pulses are intact.  Abdomen: Soft, nontender, nondistended. Spleen and liver are not enlarged.  Neurologic: Cranial nerves II through XII are grossly normal, alert and oriented x3      Assessment/Plan  1. Dry mouth     2. Essential hypertension     3. Late onset Alzheimer's disease without behavioral disturbance     4. Bilateral hearing loss due to cerumen impaction           Because of his dry mouth is unclear.  Mouth is moist on exam.  We discussed this could be a side effect of his medications.  We are going to try discontinuing his hydrochlorothiazide.  Because of the blood pressure issues we will increase the losartan from once daily to twice daily.    We discussed the dementia.  His wife is looking into additional care so that she may have a respite.  He may be a hospice candidate because of his advanced dementia complicated by previous heart valve and previous sarcoma.  Encouraged him not to drive.  He is also not going on walks by himself.  He does continue to participate in water volleyball once weekly.    Both ears were cleaned today of cerumen impaction with curette    It was discussed with the patient that they have a cerumen impaction. The risks and benefits of removal were discussed. Specifically we discussed the benefits of reduced risk of infection, improved visualization of the landmarks and to improve discomfort due to  impaction. Risk were discussed and included but not limited to infection, bleeding, pain and possibly tympanic membrane perforation. A large cerumen plug was removed from bilateral ear canals. The ear was lavaged with water and forceps were used to remove the plug. No complications from some impaction removal. Post cerumen impaction care was discussed. Encouraged regular cleaning but to avoid deep penetration with foreign objects such as a Q-tip.

## 2018-11-02 DIAGNOSIS — I10 ESSENTIAL HYPERTENSION: ICD-10-CM

## 2018-11-02 RX ORDER — LOSARTAN POTASSIUM 50 MG/1
50 TABLET ORAL 2 TIMES DAILY
Qty: 60 TAB | Refills: 5 | Status: SHIPPED | OUTPATIENT
Start: 2018-11-02 | End: 2019-01-04 | Stop reason: SDUPTHER

## 2018-12-06 RX ORDER — NAPROXEN 500 MG/1
500 TABLET ORAL
Qty: 60 TAB | Refills: 1 | Status: SHIPPED | OUTPATIENT
Start: 2018-12-06 | End: 2018-12-31 | Stop reason: SDUPTHER

## 2018-12-31 RX ORDER — NAPROXEN 500 MG/1
500 TABLET ORAL
Qty: 180 TAB | Refills: 1 | Status: SHIPPED | OUTPATIENT
Start: 2018-12-31 | End: 2020-09-07

## 2019-01-04 DIAGNOSIS — I10 ESSENTIAL HYPERTENSION: ICD-10-CM

## 2019-01-04 RX ORDER — LOSARTAN POTASSIUM 50 MG/1
50 TABLET ORAL 2 TIMES DAILY
Qty: 180 TAB | Refills: 3 | Status: SHIPPED | OUTPATIENT
Start: 2019-01-04 | End: 2019-06-05 | Stop reason: SDUPTHER

## 2019-01-24 ENCOUNTER — OFFICE VISIT (OUTPATIENT)
Dept: INTERNAL MEDICINE | Facility: IMAGING CENTER | Age: 84
End: 2019-01-24
Payer: MEDICARE

## 2019-01-24 VITALS
HEART RATE: 99 BPM | OXYGEN SATURATION: 93 % | SYSTOLIC BLOOD PRESSURE: 124 MMHG | HEIGHT: 68 IN | WEIGHT: 198 LBS | TEMPERATURE: 97.2 F | BODY MASS INDEX: 30.01 KG/M2 | DIASTOLIC BLOOD PRESSURE: 80 MMHG | RESPIRATION RATE: 16 BRPM

## 2019-01-24 DIAGNOSIS — Z95.2 S/P TAVR (TRANSCATHETER AORTIC VALVE REPLACEMENT): ICD-10-CM

## 2019-01-24 DIAGNOSIS — N18.30 CKD (CHRONIC KIDNEY DISEASE) STAGE 3, GFR 30-59 ML/MIN (HCC): ICD-10-CM

## 2019-01-24 DIAGNOSIS — E78.00 PURE HYPERCHOLESTEROLEMIA: ICD-10-CM

## 2019-01-24 DIAGNOSIS — Z85.831 HX OF SARCOMA OF SOFT TISSUE: ICD-10-CM

## 2019-01-24 DIAGNOSIS — I10 ESSENTIAL HYPERTENSION: ICD-10-CM

## 2019-01-24 PROCEDURE — 99214 OFFICE O/P EST MOD 30 MIN: CPT | Performed by: INTERNAL MEDICINE

## 2019-01-24 RX ORDER — HYDROCHLOROTHIAZIDE 25 MG/1
25 TABLET ORAL
Qty: 90 TAB | Refills: 2
Start: 2019-01-24 | End: 2020-04-27 | Stop reason: SDUPTHER

## 2019-01-24 NOTE — LETTER
January 24, 2019        Anthony Coleman  [address] [address]    Dear Anthony:    Current Outpatient Prescriptions on File Prior to Visit   Medication Sig Dispense Refill   • losartan (COZAAR) 50 MG Tab Take 1 Tab by mouth 2 Times a Day. 180 Tab 3   • naproxen (NAPROSYN) 500 MG Tab Take 1 Tab by mouth 2 times daily with meals as needed. 180 Tab 1   • esomeprazole (NEXIUM) 20 MG capsule Take 1 Cap by mouth every morning before breakfast. 90 Cap 3   • Diclofenac Sodium 1 % Gel Apply 2 g to each hand 4 times daily as needed for arthritis pain. 100 g 3   • escitalopram (LEXAPRO) 20 MG tablet Take 1 Tab by mouth every day. 90 Tab 3   • ipratropium (ATROVENT) 0.03 % Solution Spray 2 Sprays in nose every 12 hours. As needed for rhinitis 3 Bottle 3     No current facility-administered medications on file prior to visit.          If you have any questions or concerns, please don't hesitate to call.    Sincerely,        Phil Mcgee M.D.    Electronically Signed

## 2019-01-24 NOTE — LETTER
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  This medicine is for cholesterol.  ESCITALOPRAM OXALATE 20MG take one pill in the morning.  This medication is for depression.  ESOMEPRAZOLE MAG DR 20MG take one pill in the morning.  This medication is for reflux/heartburn.  HYDROCHLOROTHIAZIDE 25MG take one pill in the morning.  This medication is for hypertension.  NAPROXEN 500MG take 1 tablet twice daily as needed for pain.  Take with food.   LOSARTAN 50 mg take 1 tablet twice daily.  This medication is for hypertension    If you have any questions or concerns, please don't hesitate to call.        Sincerely,        Phil Mcgee M.D.    Electronically Signed

## 2019-01-24 NOTE — PROGRESS NOTES
Chief Complaint   Patient presents with   • Dementia   • Hypertension   • Heart Problem     post TAVR       HISTORY OF THE PRESENT ILLNESS: Patient is a 89 y.o. male.     Patient comes into discuss chronic issues but he is primarily here to get a list of his current medications.  He has had some confusion about feeling his organizer.  He has a history of dementia.  He is still able to perform all activities of daily living.  He is no longer driving.  He continues to exercise regularly and goes to water volleyball 2 or 3 days weekly.  He is socially active with his friends and neighbors.  He does all the cooking at the home.    He has a history of hyperlipidemia which has been stable on Crestor.  Lab Results   Component Value Date/Time    CHOLSTRLTOT 161 05/30/2018 10:00 AM    LDL 96 05/30/2018 10:00 AM    HDL 53 05/30/2018 10:00 AM    TRIGLYCERIDE 60 05/30/2018 10:00 AM       He has a history of depression which has been stable on Lexapro.    He has a history of reflux which has been stable on Nexium.    He has a history of hypertension.  There has been some confusion about his medications but currently we have that he is taking losartan 50 mg twice daily and hydrochlorothiazide 25 mg.  His last laboratory was in May 2018.  At that time his creatinine was 1.22.  His potassium was normal.  His creatinine typically runs 1.1-1.4.    He has a history of aortic valve replacement.  This was done via TAVR.  He denies chest pain, shortness of breath, edema or palpitations.    He has a history of sarcoma.  He is post resection.  There is been no sign of recurrence.        Allergies: Pcn [penicillins] and Sulfa drugs    Current Outpatient Prescriptions Ordered in [facility]   Medication Sig Dispense Refill   • hydroCHLOROthiazide (HYDRODIURIL) 25 MG Tab Take 1 Tab by mouth every day. 90 Tab 2   • losartan (COZAAR) 50 MG Tab Take 1 Tab by mouth 2 Times a Day. 180 Tab 3   • naproxen (NAPROSYN) 500 MG Tab Take 1 Tab by mouth 2 times  "daily with meals as needed. 180 Tab 1   • esomeprazole (NEXIUM) 20 MG capsule Take 1 Cap by mouth every morning before breakfast. 90 Cap 3   • Diclofenac Sodium 1 % Gel Apply 2 g to each hand 4 times daily as needed for arthritis pain. 100 g 3   • escitalopram (LEXAPRO) 20 MG tablet Take 1 Tab by mouth every day. 90 Tab 3   • ipratropium (ATROVENT) 0.03 % Solution Spray 2 Sprays in nose every 12 hours. As needed for rhinitis 3 Bottle 3     No current Epic-ordered facility-administered medications on file.        Past medical history, social history and family history were reviewed from chart today    Review of systems: Per HPI.    Denies headache, chest pain, fever, chills, diarrhea, constipation, abdominal pain, palpitations, depression   All others negative.     Exam: Blood pressure 124/80, pulse 99, temperature 36.2 °C (97.2 °F), temperature source Temporal, resp. rate 16, height 1.727 m (5' 8\"), weight 89.8 kg (198 lb), SpO2 93 %.  General: Well-appearing. Well-developed. No signs of distress.  HEENT: Grossly normal. Oral cavity is pink and moist.  Neck: Supple without JVD or bruit.  Pulmonary: Clear with good breath sounds. Normal effort.  Cardiovascular: Regular. Carotid and radial pulses are intact.  Abdomen: Soft, nontender, nondistended. Spleen and liver are not enlarged.  Neurologic: Cranial nerves II through XII are grossly normal, alert and oriented x3      Assessment/Plan  1. Essential hypertension  hydroCHLOROthiazide (HYDRODIURIL) 25 MG Tab   2. Pure hypercholesterolemia     3. 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 CKD (chronic kidney disease) stage 3, GFR 30-59 ml/min ([facility])         A letter with his current medications including dosing and instructions was given to the patient and his wife.  Recommend no change in medications at this time.  He will be due for routine follow-up laboratory before his next visit  Encouraged regular activity and " minimal alcohol.  He has been drinking 1 drink nightly which I encouraged him to consider discontinuing.

## 2019-03-11 DIAGNOSIS — F33.40 RECURRENT MAJOR DEPRESSIVE DISORDER, IN REMISSION (HCC): ICD-10-CM

## 2019-03-11 RX ORDER — ESCITALOPRAM OXALATE 20 MG/1
20 TABLET ORAL DAILY
Qty: 90 TAB | Refills: 3 | Status: SHIPPED | OUTPATIENT
Start: 2019-03-11 | End: 2020-03-16 | Stop reason: SDUPTHER

## 2019-04-08 ENCOUNTER — OFFICE VISIT (OUTPATIENT)
Dept: INTERNAL MEDICINE | Facility: IMAGING CENTER | Age: 84
End: 2019-04-08
Payer: MEDICARE

## 2019-04-08 VITALS
OXYGEN SATURATION: 94 % | HEART RATE: 95 BPM | HEIGHT: 68 IN | WEIGHT: 197 LBS | DIASTOLIC BLOOD PRESSURE: 80 MMHG | BODY MASS INDEX: 29.86 KG/M2 | SYSTOLIC BLOOD PRESSURE: 132 MMHG | TEMPERATURE: 97 F | RESPIRATION RATE: 16 BRPM

## 2019-04-08 DIAGNOSIS — L85.3 XEROSIS OF SKIN: ICD-10-CM

## 2019-04-08 DIAGNOSIS — H61.23 BILATERAL IMPACTED CERUMEN: ICD-10-CM

## 2019-04-08 PROCEDURE — 99213 OFFICE O/P EST LOW 20 MIN: CPT | Performed by: INTERNAL MEDICINE

## 2019-04-08 NOTE — PROGRESS NOTES
"Chief Complaint   Patient presents with   • Itchy       HISTORY OF THE PRESENT ILLNESS: Patient is a 89 y.o. male.     Patient comes in with 2 complaints:    First, he complains of itchy skin on his legs and back.  Symptoms are worse at night.  He takes hot showers in the morning because of low back pain.  He has found that using lotion helps.  He is also used bags of ice on his legs at night which has helped.  No ulcerations.  No rash.    He also complains of bilateral plugged ears.  He was undergoing evaluation for hearing aids and was told that he had significant cerumen impaction.      Allergies: Pcn [penicillins] and Sulfa drugs    Current Outpatient Prescriptions Ordered in [facility]   Medication Sig Dispense Refill   • escitalopram (LEXAPRO) 20 MG tablet Take 1 Tab by mouth every day. 90 Tab 3   • hydroCHLOROthiazide (HYDRODIURIL) 25 MG Tab Take 1 Tab by mouth every day. 90 Tab 2   • losartan (COZAAR) 50 MG Tab Take 1 Tab by mouth 2 Times a Day. 180 Tab 3   • naproxen (NAPROSYN) 500 MG Tab Take 1 Tab by mouth 2 times daily with meals as needed. 180 Tab 1   • esomeprazole (NEXIUM) 20 MG capsule Take 1 Cap by mouth every morning before breakfast. 90 Cap 3   • Diclofenac Sodium 1 % Gel Apply 2 g to each hand 4 times daily as needed for arthritis pain. 100 g 3   • ipratropium (ATROVENT) 0.03 % Solution Spray 2 Sprays in nose every 12 hours. As needed for rhinitis 3 Bottle 3     No current Epic-ordered facility-administered medications on file.        Past medical history, social history and family history were reviewed from chart today    Review of systems: Per HPI.    Denies headache, chest pain, fever, chills, diarrhea, constipation, abdominal pain, palpitations, depression   All others negative.     Exam: /80 (BP Location: Left arm, Patient Position: Sitting, BP Cuff Size: Adult)   Pulse 95   Temp 36.1 °C (97 °F) (Temporal)   Resp 16   Ht 1.727 m (5' 8\")   Wt 89.4 kg (197 lb)   SpO2 94%   General: " Well-appearing. Well-developed. No signs of distress.  HEENT: Grossly normal. Oral cavity is pink and moist.  Bilateral canals were obstructed with cerumen and dry skin.  Canals were normal and tympanic membranes were visualized after removal discussed below  Neck: Supple without JVD or bruit.  Pulmonary: Clear with good breath sounds. Normal effort.  Cardiovascular: Regular.  3/6 holosystolic murmur carotid and radial pulses are intact.  Abdomen: Soft, nontender, nondistended. Spleen and liver are not enlarged.  Neurologic: Cranial nerves II through XII are grossly normal, alert and oriented x3  Skin: Dry scaly skin on legs.    Diagnosis:  1. Xerosis of skin     2. Bilateral impacted cerumen         Assessment:  Xeroderma  Cerumen impaction    Plan:  Recommended Eucerin cream for dry skin.  We also discussed attempted showers.  We also discussed Dove body wash.    For the cerumen impaction I recommended removal.  It was discussed with the patient that they have a cerumen impaction. The risks and benefits of removal were discussed. Specifically we discussed the benefits of reduced risk of infection, improved visualization of the landmarks and to improve discomfort due to  impaction. Risk were discussed and included but not limited to infection, bleeding, pain and possibly tympanic membrane perforation. A large cerumen plug was removed from each ear canal. The ear was lavaged with water and forceps were used to remove the plug. No complications from some impaction removal. Post cerumen impaction care was discussed. Encouraged regular cleaning but to avoid deep penetration with foreign objects such as a Q-tip.

## 2019-04-19 ENCOUNTER — APPOINTMENT (RX ONLY)
Dept: URBAN - METROPOLITAN AREA CLINIC 4 | Facility: CLINIC | Age: 84
Setting detail: DERMATOLOGY
End: 2019-04-19

## 2019-04-19 DIAGNOSIS — D18.0 HEMANGIOMA: ICD-10-CM

## 2019-04-19 DIAGNOSIS — L30.9 DERMATITIS, UNSPECIFIED: ICD-10-CM

## 2019-04-19 DIAGNOSIS — L57.0 ACTINIC KERATOSIS: ICD-10-CM

## 2019-04-19 DIAGNOSIS — L81.4 OTHER MELANIN HYPERPIGMENTATION: ICD-10-CM

## 2019-04-19 DIAGNOSIS — D22 MELANOCYTIC NEVI: ICD-10-CM

## 2019-04-19 DIAGNOSIS — L57.8 OTHER SKIN CHANGES DUE TO CHRONIC EXPOSURE TO NONIONIZING RADIATION: ICD-10-CM

## 2019-04-19 DIAGNOSIS — L82.1 OTHER SEBORRHEIC KERATOSIS: ICD-10-CM

## 2019-04-19 PROBLEM — D18.01 HEMANGIOMA OF SKIN AND SUBCUTANEOUS TISSUE: Status: ACTIVE | Noted: 2019-04-19

## 2019-04-19 PROBLEM — D22.5 MELANOCYTIC NEVI OF TRUNK: Status: ACTIVE | Noted: 2019-04-19

## 2019-04-19 PROBLEM — H91.90 UNSPECIFIED HEARING LOSS, UNSPECIFIED EAR: Status: ACTIVE | Noted: 2019-04-19

## 2019-04-19 PROCEDURE — 17000 DESTRUCT PREMALG LESION: CPT

## 2019-04-19 PROCEDURE — ? LIQUID NITROGEN

## 2019-04-19 PROCEDURE — ? COUNSELING

## 2019-04-19 PROCEDURE — 99203 OFFICE O/P NEW LOW 30 MIN: CPT | Mod: 25

## 2019-04-19 PROCEDURE — ? PRESCRIPTION

## 2019-04-19 RX ORDER — TRIAMCINOLONE ACETONIDE 1 MG/G
CREAM TOPICAL
Qty: 1 | Refills: 3 | Status: ERX | COMMUNITY
Start: 2019-04-19

## 2019-04-19 RX ADMIN — TRIAMCINOLONE ACETONIDE: 1 CREAM TOPICAL at 21:27

## 2019-04-19 ASSESSMENT — LOCATION DETAILED DESCRIPTION DERM
LOCATION DETAILED: LEFT PROXIMAL DORSAL FOREARM
LOCATION DETAILED: LEFT MEDIAL SUPERIOR CHEST
LOCATION DETAILED: RIGHT MID-UPPER BACK
LOCATION DETAILED: LEFT ANTERIOR DISTAL THIGH
LOCATION DETAILED: RIGHT CENTRAL MALAR CHEEK
LOCATION DETAILED: RIGHT SUPERIOR MEDIAL UPPER BACK
LOCATION DETAILED: RIGHT ANTERIOR PROXIMAL UPPER ARM
LOCATION DETAILED: RIGHT INFERIOR LATERAL MALAR CHEEK
LOCATION DETAILED: RIGHT PROXIMAL DORSAL FOREARM
LOCATION DETAILED: RIGHT INFERIOR UPPER BACK
LOCATION DETAILED: LEFT INFERIOR CENTRAL MALAR CHEEK
LOCATION DETAILED: RIGHT ANTERIOR DISTAL THIGH
LOCATION DETAILED: LEFT ANTERIOR PROXIMAL UPPER ARM

## 2019-04-19 ASSESSMENT — LOCATION SIMPLE DESCRIPTION DERM
LOCATION SIMPLE: RIGHT CHEEK
LOCATION SIMPLE: LEFT THIGH
LOCATION SIMPLE: RIGHT CHEEK
LOCATION SIMPLE: RIGHT THIGH
LOCATION SIMPLE: RIGHT FOREARM
LOCATION SIMPLE: RIGHT UPPER BACK
LOCATION SIMPLE: LEFT FOREARM
LOCATION SIMPLE: LEFT CHEEK
LOCATION SIMPLE: LEFT UPPER ARM
LOCATION SIMPLE: RIGHT UPPER ARM
LOCATION SIMPLE: CHEST

## 2019-04-19 ASSESSMENT — LOCATION ZONE DERM
LOCATION ZONE: LEG
LOCATION ZONE: FACE
LOCATION ZONE: TRUNK
LOCATION ZONE: FACE
LOCATION ZONE: ARM

## 2019-04-19 NOTE — PROCEDURE: LIQUID NITROGEN
Aperture Size (Optional): C
Duration Of Freeze Thaw-Cycle (Seconds): 3
Number Of Freeze-Thaw Cycles: 2 freeze-thaw cycles
Post-Care Instructions: I reviewed with the patient in detail post-care instructions. Patient is to wear sunprotection, and avoid picking at any of the treated lesions. Pt may apply Vaseline to crusted or scabbing areas.
Detail Level: Simple
Render Post-Care Instructions In Note?: no
Consent: The patient's consent was obtained including but not limited to risks of crusting, scabbing, blistering, scarring, darker or lighter pigmentary change, recurrence, incomplete removal and infection.

## 2019-05-16 DIAGNOSIS — I10 ESSENTIAL HYPERTENSION: ICD-10-CM

## 2019-05-16 RX ORDER — HYDROCHLOROTHIAZIDE 25 MG/1
TABLET ORAL
Qty: 90 TAB | Refills: 2 | Status: SHIPPED | OUTPATIENT
Start: 2019-05-16 | End: 2020-09-07

## 2019-06-05 DIAGNOSIS — I10 ESSENTIAL HYPERTENSION: ICD-10-CM

## 2019-06-05 RX ORDER — LOSARTAN POTASSIUM 50 MG/1
50 TABLET ORAL 2 TIMES DAILY
Qty: 180 TAB | Refills: 3 | Status: SHIPPED | OUTPATIENT
Start: 2019-06-05 | End: 2020-04-27

## 2019-06-05 RX ORDER — ROSUVASTATIN CALCIUM 10 MG/1
10 TABLET, COATED ORAL EVERY EVENING
Qty: 90 TAB | Refills: 3 | Status: SHIPPED | OUTPATIENT
Start: 2019-06-05 | End: 2020-04-27

## 2019-07-03 ENCOUNTER — TELEPHONE (OUTPATIENT)
Dept: INTERNAL MEDICINE | Facility: IMAGING CENTER | Age: 84
End: 2019-07-03

## 2019-07-03 NOTE — TELEPHONE ENCOUNTER
Anthony c/o itching of legs.  Dr Mcgee recommends Lubriderm or Goldbond cream, avoid antihistamines.

## 2019-10-10 ENCOUNTER — APPOINTMENT (OUTPATIENT)
Dept: INTERNAL MEDICINE | Facility: IMAGING CENTER | Age: 84
End: 2019-10-10
Payer: MEDICARE

## 2019-11-26 ENCOUNTER — APPOINTMENT (RX ONLY)
Dept: URBAN - METROPOLITAN AREA CLINIC 20 | Facility: CLINIC | Age: 84
Setting detail: DERMATOLOGY
End: 2019-11-26

## 2019-12-09 ENCOUNTER — APPOINTMENT (RX ONLY)
Dept: URBAN - METROPOLITAN AREA CLINIC 20 | Facility: CLINIC | Age: 84
Setting detail: DERMATOLOGY
End: 2019-12-09

## 2019-12-09 DIAGNOSIS — L85.3 XEROSIS CUTIS: ICD-10-CM

## 2019-12-09 PROBLEM — D48.5 NEOPLASM OF UNCERTAIN BEHAVIOR OF SKIN: Status: ACTIVE | Noted: 2019-12-09

## 2019-12-09 PROCEDURE — ? ADDITIONAL NOTES

## 2019-12-09 PROCEDURE — 99213 OFFICE O/P EST LOW 20 MIN: CPT | Mod: 25

## 2019-12-09 PROCEDURE — 11102 TANGNTL BX SKIN SINGLE LES: CPT

## 2019-12-09 PROCEDURE — ? BIOPSY BY SHAVE METHOD

## 2019-12-09 PROCEDURE — ? COUNSELING

## 2019-12-09 ASSESSMENT — LOCATION SIMPLE DESCRIPTION DERM
LOCATION SIMPLE: LEFT FOREARM
LOCATION SIMPLE: RIGHT FOREARM

## 2019-12-09 ASSESSMENT — LOCATION DETAILED DESCRIPTION DERM
LOCATION DETAILED: LEFT PROXIMAL DORSAL FOREARM
LOCATION DETAILED: RIGHT DISTAL RADIAL DORSAL FOREARM

## 2019-12-09 ASSESSMENT — LOCATION ZONE DERM: LOCATION ZONE: ARM

## 2019-12-09 NOTE — PROCEDURE: ADDITIONAL NOTES
Additional Notes: Includes spots of concern on intake.
Detail Level: Generalized
Additional Notes: Recommended patient apply cerave cream QD to entire body.

## 2019-12-09 NOTE — PROCEDURE: BIOPSY BY SHAVE METHOD
Lab Facility: 
Wound Care: Petrolatum
Depth Of Biopsy: dermis
Hide Anesthesia Volume?: No
Post-Care Instructions: I reviewed with the patient in detail post-care instructions. Patient is to keep the biopsy site dry overnight, and then apply bacitracin twice daily until healed. Patient may apply hydrogen peroxide soaks to remove any crusting.
Cryotherapy Text: The wound bed was treated with cryotherapy after the biopsy was performed.
Additional Anesthesia Volume In Cc (Will Not Render If 0): 0
Size Of Lesion In Cm: 1
Anesthesia Type: 1% lidocaine with epinephrine
Lab: [id number]
Type Of Destruction Used: Curettage
Anesthesia Volume In Cc: 0.5
Biopsy Type: H and E
Notification Instructions: Patient will be notified of biopsy results. However, patient instructed to call the office if not contacted within 2 weeks.
Electrodesiccation Text: The wound bed was treated with electrodesiccation after the biopsy was performed.
Was A Bandage Applied: Yes
Dressing: bandage
Consent: Written consent was obtained and risks were reviewed including but not limited to scarring, infection, bleeding, scabbing, incomplete removal, nerve damage and allergy to anesthesia.
Electrodesiccation And Curettage Text: The wound bed was treated with electrodesiccation and curettage after the biopsy was performed.
Billing Type: Third-Party Bill
Curettage Text: The wound bed was treated with curettage after the biopsy was performed.
Detail Level: Detailed
Biopsy Method: Dermablade
Silver Nitrate Text: The wound bed was treated with silver nitrate after the biopsy was performed.
Hemostasis: Drysol

## 2020-01-28 ENCOUNTER — NON-PROVIDER VISIT (OUTPATIENT)
Dept: INTERNAL MEDICINE | Facility: IMAGING CENTER | Age: 85
End: 2020-01-28
Payer: MEDICARE

## 2020-01-28 DIAGNOSIS — R35.1 FREQUENT URINATION AT NIGHT: ICD-10-CM

## 2020-01-28 LAB
APPEARANCE UR: CLEAR
BILIRUB UR STRIP-MCNC: NORMAL MG/DL
COLOR UR AUTO: YELLOW
GLUCOSE UR STRIP.AUTO-MCNC: NORMAL MG/DL
KETONES UR STRIP.AUTO-MCNC: NORMAL MG/DL
LEUKOCYTE ESTERASE UR QL STRIP.AUTO: NORMAL
NITRITE UR QL STRIP.AUTO: NORMAL
PH UR STRIP.AUTO: 5 [PH] (ref 5–8)
PROT UR QL STRIP: NORMAL MG/DL
RBC UR QL AUTO: NORMAL
SP GR UR STRIP.AUTO: 1.02
UROBILINOGEN UR STRIP-MCNC: NORMAL MG/DL

## 2020-01-28 PROCEDURE — 81002 URINALYSIS NONAUTO W/O SCOPE: CPT | Performed by: INTERNAL MEDICINE

## 2020-01-30 ENCOUNTER — OFFICE VISIT (OUTPATIENT)
Dept: INTERNAL MEDICINE | Facility: IMAGING CENTER | Age: 85
End: 2020-01-30
Payer: MEDICARE

## 2020-01-30 VITALS
BODY MASS INDEX: 31.07 KG/M2 | SYSTOLIC BLOOD PRESSURE: 110 MMHG | HEIGHT: 68 IN | TEMPERATURE: 97.7 F | DIASTOLIC BLOOD PRESSURE: 70 MMHG | HEART RATE: 93 BPM | RESPIRATION RATE: 16 BRPM | WEIGHT: 205 LBS | OXYGEN SATURATION: 97 %

## 2020-01-30 DIAGNOSIS — R35.1 BENIGN PROSTATIC HYPERPLASIA WITH NOCTURIA: ICD-10-CM

## 2020-01-30 DIAGNOSIS — N40.1 BENIGN PROSTATIC HYPERPLASIA WITH NOCTURIA: ICD-10-CM

## 2020-01-30 DIAGNOSIS — R35.1 NOCTURIA: ICD-10-CM

## 2020-01-30 DIAGNOSIS — G30.1 LATE ONSET ALZHEIMER'S DISEASE WITHOUT BEHAVIORAL DISTURBANCE (HCC): ICD-10-CM

## 2020-01-30 DIAGNOSIS — F02.80 LATE ONSET ALZHEIMER'S DISEASE WITHOUT BEHAVIORAL DISTURBANCE (HCC): ICD-10-CM

## 2020-01-30 PROCEDURE — 99213 OFFICE O/P EST LOW 20 MIN: CPT | Performed by: INTERNAL MEDICINE

## 2020-01-30 RX ORDER — TAMSULOSIN HYDROCHLORIDE 0.4 MG/1
0.4 CAPSULE ORAL
Qty: 30 CAP | Refills: 6 | Status: SHIPPED | OUTPATIENT
Start: 2020-01-30 | End: 2020-08-24

## 2020-01-30 NOTE — PROGRESS NOTES
Chief Complaint   Patient presents with   • Urinary Pain   • Benign Prostatic Hypertrophy   • Dementia       HISTORY OF THE PRESENT ILLNESS: Patient is a 89 y.o. male.     Patient comes in with complaint of nocturia.  He is up for 5 times nightly.  No daytime symptoms.  He was previously followed by urology.  This was primarily due to history of sarcoma.  He has a history of BPH noted by urology.  No treatment was started due to lack of symptoms.    Patient has moderate to advanced dementia.  He cannot recall his medications or recent events.  He is friendly and pleasant.  He is able to maintain a normal conversation.  No known behavioral issues.  No wandering.  He was here with a caregiver today who helps around the house and drives for he and his wife.    Allergies: Pcn [penicillins] and Sulfa drugs    Current Outpatient Medications Ordered in Epic   Medication Sig Dispense Refill   • tamsulosin (FLOMAX) 0.4 MG capsule Take 1 Cap by mouth ONE-HALF HOUR AFTER BREAKFAST. 30 Cap 6   • esomeprazole (NEXIUM) 20 MG capsule TAKE 1 CAP BY MOUTH EVERY MORNING BEFORE BREAKFAST. 90 Cap 3   • losartan (COZAAR) 50 MG Tab Take 1 Tab by mouth 2 Times a Day. 180 Tab 3   • rosuvastatin (CRESTOR) 10 MG Tab Take 1 Tab by mouth every evening. 90 Tab 3   • hydroCHLOROthiazide (HYDRODIURIL) 25 MG Tab TAKE 1 TABLET BY MOUTH EVERY DAY 90 Tab 2   • escitalopram (LEXAPRO) 20 MG tablet Take 1 Tab by mouth every day. 90 Tab 3   • hydroCHLOROthiazide (HYDRODIURIL) 25 MG Tab Take 1 Tab by mouth every day. 90 Tab 2   • naproxen (NAPROSYN) 500 MG Tab Take 1 Tab by mouth 2 times daily with meals as needed. 180 Tab 1   • Diclofenac Sodium 1 % Gel Apply 2 g to each hand 4 times daily as needed for arthritis pain. 100 g 3   • ipratropium (ATROVENT) 0.03 % Solution Spray 2 Sprays in nose every 12 hours. As needed for rhinitis 3 Bottle 3     No current Epic-ordered facility-administered medications on file.        Past medical history, social history  "and family history were reviewed from chart today    Review of systems: Per HPI.    Denies headache, chest pain, fever, chills, diarrhea, constipation, abdominal pain, palpitations, depression   All others negative.     Exam: /70 (BP Location: Left arm, Patient Position: Sitting, BP Cuff Size: Adult)   Pulse 93   Temp 36.5 °C (97.7 °F) (Temporal)   Resp 16   Ht 1.727 m (5' 8\")   Wt 93 kg (205 lb)   SpO2 97%   General: Well-appearing. Well-developed. No signs of distress.  HEENT: Grossly normal. Oral cavity is pink and moist.  Neck: Supple without JVD or bruit.  Pulmonary: Clear with good breath sounds. Normal effort.  Cardiovascular: Regular. Carotid and radial pulses are intact.  Abdomen: Soft, nontender, nondistended. Spleen and liver are not enlarged.  Neurologic: Cranial nerves II through XII are grossly normal, alert and oriented x3      Diagnosis:  1. Nocturia     2. Benign prostatic hyperplasia with nocturia     3. Late onset Alzheimer's disease without behavioral disturbance (HCC)         Trial of tamsulosin.  Consider increasing to twice daily if needed.  No change in treatment for dementia.  He has good insight regarding his disease.  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  He remains happy overall.  Notes were given to his caregiver regarding the new prescription.  She will  at the pharmacy and start him on the medication today.  His medications are typically late out daily for him to take.    "

## 2020-02-28 ENCOUNTER — OFFICE VISIT (OUTPATIENT)
Dept: INTERNAL MEDICINE | Facility: IMAGING CENTER | Age: 85
End: 2020-02-28
Payer: MEDICARE

## 2020-02-28 ENCOUNTER — HOSPITAL ENCOUNTER (OUTPATIENT)
Facility: MEDICAL CENTER | Age: 85
End: 2020-02-28
Attending: INTERNAL MEDICINE
Payer: MEDICARE

## 2020-02-28 VITALS
OXYGEN SATURATION: 96 % | TEMPERATURE: 97.7 F | HEIGHT: 68 IN | BODY MASS INDEX: 31.37 KG/M2 | HEART RATE: 99 BPM | WEIGHT: 207 LBS | SYSTOLIC BLOOD PRESSURE: 110 MMHG | RESPIRATION RATE: 16 BRPM | DIASTOLIC BLOOD PRESSURE: 60 MMHG

## 2020-02-28 DIAGNOSIS — F33.40 RECURRENT MAJOR DEPRESSIVE DISORDER, IN REMISSION (HCC): ICD-10-CM

## 2020-02-28 DIAGNOSIS — R40.0 DAYTIME SOMNOLENCE: ICD-10-CM

## 2020-02-28 DIAGNOSIS — I50.32 CHRONIC DIASTOLIC CHF (CONGESTIVE HEART FAILURE) (HCC): ICD-10-CM

## 2020-02-28 DIAGNOSIS — G30.1 LATE ONSET ALZHEIMER'S DISEASE WITHOUT BEHAVIORAL DISTURBANCE (HCC): ICD-10-CM

## 2020-02-28 DIAGNOSIS — R06.83 SNORING: ICD-10-CM

## 2020-02-28 DIAGNOSIS — R53.83 OTHER FATIGUE: ICD-10-CM

## 2020-02-28 DIAGNOSIS — I10 ESSENTIAL HYPERTENSION: ICD-10-CM

## 2020-02-28 DIAGNOSIS — K21.9 GASTROESOPHAGEAL REFLUX DISEASE WITHOUT ESOPHAGITIS: ICD-10-CM

## 2020-02-28 DIAGNOSIS — Z00.00 MEDICARE ANNUAL WELLNESS VISIT, SUBSEQUENT: ICD-10-CM

## 2020-02-28 DIAGNOSIS — E78.00 PURE HYPERCHOLESTEROLEMIA: ICD-10-CM

## 2020-02-28 DIAGNOSIS — F02.80 LATE ONSET ALZHEIMER'S DISEASE WITHOUT BEHAVIORAL DISTURBANCE (HCC): ICD-10-CM

## 2020-02-28 DIAGNOSIS — F01.50 VASCULAR DEMENTIA WITHOUT BEHAVIORAL DISTURBANCE (HCC): ICD-10-CM

## 2020-02-28 DIAGNOSIS — E66.09 CLASS 1 OBESITY DUE TO EXCESS CALORIES WITHOUT SERIOUS COMORBIDITY WITH BODY MASS INDEX (BMI) OF 31.0 TO 31.9 IN ADULT: ICD-10-CM

## 2020-02-28 DIAGNOSIS — N18.30 CKD (CHRONIC KIDNEY DISEASE) STAGE 3, GFR 30-59 ML/MIN: ICD-10-CM

## 2020-02-28 LAB
ALBUMIN SERPL BCP-MCNC: 4.1 G/DL (ref 3.2–4.9)
ALBUMIN/GLOB SERPL: 1.4 G/DL
ALP SERPL-CCNC: 40 U/L (ref 30–99)
ALT SERPL-CCNC: 16 U/L (ref 2–50)
ANION GAP SERPL CALC-SCNC: 7 MMOL/L (ref 0–11.9)
AST SERPL-CCNC: 19 U/L (ref 12–45)
BASOPHILS # BLD AUTO: 0.6 % (ref 0–1.8)
BASOPHILS # BLD: 0.05 K/UL (ref 0–0.12)
BILIRUB SERPL-MCNC: 0.5 MG/DL (ref 0.1–1.5)
BUN SERPL-MCNC: 35 MG/DL (ref 8–22)
CALCIUM SERPL-MCNC: 9.5 MG/DL (ref 8.5–10.5)
CHLORIDE SERPL-SCNC: 103 MMOL/L (ref 96–112)
CO2 SERPL-SCNC: 28 MMOL/L (ref 20–33)
CREAT SERPL-MCNC: 1.7 MG/DL (ref 0.5–1.4)
EOSINOPHIL # BLD AUTO: 0.39 K/UL (ref 0–0.51)
EOSINOPHIL NFR BLD: 4.6 % (ref 0–6.9)
ERYTHROCYTE [DISTWIDTH] IN BLOOD BY AUTOMATED COUNT: 52.5 FL (ref 35.9–50)
GLOBULIN SER CALC-MCNC: 2.9 G/DL (ref 1.9–3.5)
GLUCOSE SERPL-MCNC: 118 MG/DL (ref 65–99)
HCT VFR BLD AUTO: 43.4 % (ref 42–52)
HGB BLD-MCNC: 13.6 G/DL (ref 14–18)
IMM GRANULOCYTES # BLD AUTO: 0.06 K/UL (ref 0–0.11)
IMM GRANULOCYTES NFR BLD AUTO: 0.7 % (ref 0–0.9)
LYMPHOCYTES # BLD AUTO: 1.01 K/UL (ref 1–4.8)
LYMPHOCYTES NFR BLD: 11.8 % (ref 22–41)
MCH RBC QN AUTO: 29.6 PG (ref 27–33)
MCHC RBC AUTO-ENTMCNC: 31.3 G/DL (ref 33.7–35.3)
MCV RBC AUTO: 94.6 FL (ref 81.4–97.8)
MONOCYTES # BLD AUTO: 0.55 K/UL (ref 0–0.85)
MONOCYTES NFR BLD AUTO: 6.4 % (ref 0–13.4)
NEUTROPHILS # BLD AUTO: 6.47 K/UL (ref 1.82–7.42)
NEUTROPHILS NFR BLD: 75.9 % (ref 44–72)
NRBC # BLD AUTO: 0 K/UL
NRBC BLD-RTO: 0 /100 WBC
PLATELET # BLD AUTO: 233 K/UL (ref 164–446)
PMV BLD AUTO: 12.7 FL (ref 9–12.9)
POTASSIUM SERPL-SCNC: 4.2 MMOL/L (ref 3.6–5.5)
PROT SERPL-MCNC: 7 G/DL (ref 6–8.2)
RBC # BLD AUTO: 4.59 M/UL (ref 4.7–6.1)
SODIUM SERPL-SCNC: 138 MMOL/L (ref 135–145)
TSH SERPL DL<=0.005 MIU/L-ACNC: 2.5 UIU/ML (ref 0.38–5.33)
WBC # BLD AUTO: 8.5 K/UL (ref 4.8–10.8)

## 2020-02-28 PROCEDURE — G0439 PPPS, SUBSEQ VISIT: HCPCS | Performed by: INTERNAL MEDICINE

## 2020-02-28 PROCEDURE — 85025 COMPLETE CBC W/AUTO DIFF WBC: CPT

## 2020-02-28 PROCEDURE — 80053 COMPREHEN METABOLIC PANEL: CPT

## 2020-02-28 PROCEDURE — 84443 ASSAY THYROID STIM HORMONE: CPT

## 2020-02-28 ASSESSMENT — ACTIVITIES OF DAILY LIVING (ADL): BATHING_REQUIRES_ASSISTANCE: 1

## 2020-02-28 ASSESSMENT — ENCOUNTER SYMPTOMS: GENERAL WELL-BEING: EXCELLENT

## 2020-02-28 ASSESSMENT — PATIENT HEALTH QUESTIONNAIRE - PHQ9: CLINICAL INTERPRETATION OF PHQ2 SCORE: 0

## 2020-02-28 ASSESSMENT — FIBROSIS 4 INDEX: FIB4 SCORE: 1.54

## 2020-02-28 NOTE — PROGRESS NOTES
89 y.o. male presents for the following:    Patient comes in for annual health risk assessment. He feels that he is in fair/good health. He has moderate to advanced dementia. He denies any balance issues. He also has a history of depression but denies any depression symptoms currently. He is able to answer questions appropriately but does not remember specific medical events or his current medications. His wife is with him today and helps answers questions.    Hypertension-stable and current medications.  Hyperlipidemia-last cholesterol was in may 2018 and was good. LDL was 96. HDL is 53. He is on Crestor currently.  BPH-stable on Flomax.  GERD-stable on Nexium. He denies reflux but later reports that he has reflex it is stable.  Dementia-patient has probable vascular dementia. He is able to perform most activities of daily living. He is not capable of living alone or driving. He has a daily caregiver. His wife is also with him on a full-time basis.    Today, we also discussed issue of daytime somnolence.      Patient comes in with a complaint of sleeping more.  He reports he sleeps normally through the night but will sleep multiple hours during the day.  His wife is here with him today and agrees.  He has a history of dementia.  He admits that his memory is poor but is able to have a normal conversation and has good insight regarding his current symptoms.  His weight is up more than 30 pounds.  He is not exercising.  He and his wife have a caregiver in the house for 6-8 hours 5 days a week.  His wife reports that he snores.  He has never been tested for sleep apnea.  He has mild anemia, renal insufficiency, hypertension and depression.  No medications that are typically associated with excessive sedation.  He has a history depression but denies depression currently.  He also has a history of diastolic heart failure.  He is on diuretic and ARB.  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 at SURGERY [facility]   • MASS EXCISION GENERAL  1/8/2013    Performed by Rodrick Ruiz M.D. at SURGERY [facility]   • SHOULDER ARTHROPLASTY TOTAL  2000   • CORONARY ARTERY BYPASS, 3     • HIP ARTHROPLASTY TOTAL      bilateral   • VENTRAL HERNIA REPAIR         Family history: relating to possible risk factors for your patient  History reviewed. No pertinent family history.    Current Providers (including home care/DME’s):   Colonoscopy Not indicated Dexa  PSA    A1c  Psych-[facility] [phone]) Surg-[facility] (does not wish to return)     Cardio - [facility] Neuro-[facility]      Patient Care Team:  Phil Mcgee M.D. as PCP - General      Medications:   Current Outpatient Medications Ordered in Epic   Medication Sig Dispense Refill   • tamsulosin (FLOMAX) 0.4 MG capsule Take 1 Cap by mouth ONE-HALF HOUR AFTER BREAKFAST. 30 Cap 6   • esomeprazole (NEXIUM) 20 MG capsule TAKE 1 CAP BY MOUTH EVERY MORNING BEFORE BREAKFAST. 90 Cap 3   • losartan (COZAAR) 50 MG Tab Take 1 Tab by mouth 2 Times a Day. 180 Tab 3   • rosuvastatin (CRESTOR) 10 MG Tab Take 1 Tab by mouth every evening. 90 Tab 3   • hydroCHLOROthiazide (HYDRODIURIL) 25 MG Tab TAKE 1 TABLET BY MOUTH EVERY DAY 90 Tab 2   • escitalopram (LEXAPRO) 20  MG tablet Take 1 Tab by mouth every day. 90 Tab 3   • hydroCHLOROthiazide (HYDRODIURIL) 25 MG Tab Take 1 Tab by mouth every day. 90 Tab 2   • naproxen (NAPROSYN) 500 MG Tab Take 1 Tab by mouth 2 times daily with meals as needed. 180 Tab 1   • Diclofenac Sodium 1 % Gel Apply 2 g to each hand 4 times daily as needed for arthritis pain. 100 g 3   • ipratropium (ATROVENT) 0.03 % Solution Spray 2 Sprays in nose every 12 hours. As needed for rhinitis 3 Bottle 3     No current Epic-ordered facility-administered medications on file.        Supplements (calcium/vitamins): if not lisited in medications    Chief Complaint   Patient presents with   • Excessive Daytime Sleepiness   • Annual Exam         HPI:  Anthony Coleman is a 89 y.o. here for Medicare Annual Wellness Visit     Patient Active Problem List    Diagnosis Date Noted   • CKD (chronic kidney disease) stage 3, GFR 30-59 ml/min ([facility]) 06/04/2018   • Chronic diastolic CHF (congestive heart failure) ([facility]) 04/14/2016   • Hx of sarcoma of soft tissue 07/21/2015   • Status post cardiac pacemaker procedure 02/25/2015   • S/P TAVR (transcatheter aortic valve replacement) 02/25/2015   • CHF (congestive heart failure), NYHA class I ([facility]) 04/29/2014   • Anesthesia of skin 03/12/2013   • PVC (premature ventricular contraction) 04/12/2012   • CAD (coronary artery disease) 04/12/2012   • Elevated PSA 04/12/2012   • Aortic stenosis 01/12/2012   • Hypogonadism male 12/16/2009   • Depression    • Erectile dysfunction    • Hyperlipemia    • Hypertension    • Hypogonadism        Current Outpatient Medications   Medication Sig Dispense Refill   • tamsulosin (FLOMAX) 0.4 MG capsule Take 1 Cap by mouth ONE-HALF HOUR AFTER BREAKFAST. 30 Cap 6   • esomeprazole (NEXIUM) 20 MG capsule TAKE 1 CAP BY MOUTH EVERY MORNING BEFORE BREAKFAST. 90 Cap 3   • losartan (COZAAR) 50 MG Tab Take 1 Tab by mouth 2 Times a Day. 180 Tab 3   • rosuvastatin (CRESTOR) 10 MG Tab Take 1 Tab by mouth every  evening. 90 Tab 3   • hydroCHLOROthiazide (HYDRODIURIL) 25 MG Tab TAKE 1 TABLET BY MOUTH EVERY DAY 90 Tab 2   • escitalopram (LEXAPRO) 20 MG tablet Take 1 Tab by mouth every day. 90 Tab 3   • hydroCHLOROthiazide (HYDRODIURIL) 25 MG Tab Take 1 Tab by mouth every day. 90 Tab 2   • naproxen (NAPROSYN) 500 MG Tab Take 1 Tab by mouth 2 times daily with meals as needed. 180 Tab 1   • Diclofenac Sodium 1 % Gel Apply 2 g to each hand 4 times daily as needed for arthritis pain. 100 g 3   • ipratropium (ATROVENT) 0.03 % Solution Spray 2 Sprays in nose every 12 hours. As needed for rhinitis 3 Bottle 3     No current facility-administered medications for this visit.             Current supplements as per medication list.       Allergies: Pcn [penicillins] and Sulfa drugs    Current social contact/activities:  Social with family.    He  reports that he has never smoked. He has never used smokeless tobacco. He reports current alcohol use of about 7.0 oz of alcohol per week. He reports that he does not use drugs.  Counseling given: Not Answered        DPA/Advanced Directive:  Online in EPIC      ROS:    Gait: Uses : None  Ostomy: No  Other tubes: no   Amputations: no   Chronic oxygen use: no   Last eye exam: more than 1 year ago    : Reports urinary leakage during the last 6 months that has somewhat interfered with their daily activities or sleep. incontinence.       Screening:  Colonoscopy Not indicated Dexa  PSA    A1c  Psych-[facility] [phone]) Surg-[place] [facility] (does not wish to return)     [facility] - [facility]      Depression Screening    Little interest or pleasure in doing things?  0 - not at all  Feeling down, depressed , or hopeless? 0 - not at all  Patient Health Questionnaire Score: 0     If depressive symptoms identified deferred to follow up visit unless specifically addressed in assessment and plan.    Interpretation of PHQ-9 Total Score   Score Severity   1-4 No  Depression   5-9 Mild Depression   10-14 Moderate Depression   15-19 Moderately Severe Depression   20-27 Severe Depression    Screening for Cognitive Impairment    Three Minute Recall (village, kitchen, baby) 0/3    Luis clock face with all 12 numbers and set the hands to show 10 past 10.  No    Cognitive concerns identified deferred for follow up unless specifically addressed in assessment and plan.    Fall Risk Assessment    Has the patient had two or more falls in the last year or any fall with injury in the last year?  No    Safety Assessment    Throw rugs on floor.  Yes  Handrails on all stairs.  No  Good lighting in all hallways.  Yes  Difficulty hearing.  No  Patient counseled about all safety risks that were identified.    Functional Assessment ADLs    Are there any barriers preventing you from cooking for yourself or meeting nutritional needs?  Yes. Has ADL help daily  Are there any barriers preventing you from driving safely or obtaining transportation?  Yes. Has ADL help daily,    Are there any barriers preventing you from using a telephone or calling for help?  Yes. Has ADL help daily  Are there any barriers preventing you from shopping?  Yes. Has ADL help daily  Are there any barriers preventing you from taking care of your own finances?  Yes. Wife & children assist  Are there any barriers preventing you from managing your medications?  Yes. Wife done management  Are there any barriers preventing you from showering, bathing or dressing yourself?  Yes. Has ADL help daily  Are you currently engaging in any exercise or physical activity?  No.     What is your perception of your health?  Excellent.      Health Maintenance Summary                Annual Wellness Visit Overdue 6/5/2019      Done 6/4/2018 Visit Dx: Medicare annual wellness visit, subsequent    IMM DTaP/Tdap/Td Vaccine Next Due 10/30/2026      Done 10/30/2016 Imm Admin: Tdap Vaccine     Patient has more history with this topic...     "      Patient Care Team:  Phil Mcgee M.D. as PCP - General        Social History     Tobacco Use   • Smoking status: Never Smoker   • Smokeless tobacco: Never Used   Substance Use Topics   • Alcohol use: Yes     Alcohol/week: 7.0 oz     Types: 14 drink(s) per week     Comment: daily, 2 glasses of wine   • Drug use: No     History reviewed. No pertinent family history.  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   Past Surgical History:   Procedure Laterality Date   • LAPAROSCOPY  1/8/2013    Performed by Rodrick Ruiz M.D. at SURGERY [facility]   • MASS EXCISION GENERAL  1/8/2013    Performed by Rodrick Ruiz M.D. at SURGERY [facility]   • SHOULDER ARTHROPLASTY TOTAL  2000   • CORONARY ARTERY BYPASS, 3     • HIP ARTHROPLASTY TOTAL      bilateral   • VENTRAL HERNIA REPAIR         Exam:     /60 (BP Location: Left arm, Patient Position: Sitting, BP Cuff Size: Adult)   Pulse 99   Temp 36.5 °C (97.7 °F) (Temporal)   Resp 16   Ht 1.727 m (5' 8\")   Wt 93.9 kg (207 lb)   SpO2 96%  Body mass index is 31.47 kg/m².    Hearing good.    Dentition good  Alert, oriented in no acute distress.  Eye contact is good, speech goal directed, affect calm  General: Well-appearing. Well-developed. No signs of distress.  HEENT: Grossly normal. Oral cavity is pink and moist.  Neck: Supple without JVD or bruit.  Pulmonary: Clear with good breath sounds. Normal effort.  Cardiovascular: Regular. Carotid and radial pulses are intact.  Abdomen: Soft, nontender, nondistended. Spleen and liver are not enlarged.  Neurologic: Cranial nerves II through XII are grossly normal, alert and " oriented x3      Assessment and Plan. The following treatment and monitoring plan is recommended:    1. Other fatigue  TSH    CBC WITH DIFFERENTIAL    Comp Metabolic Panel   2. Daytime somnolence     3. Snoring     4. Class 1 obesity due to excess calories without serious comorbidity with body mass index (BMI) of 31.0 to 31.9 in adult     5. Late onset Alzheimer's disease without behavioral disturbance ([facility])     6. Recurrent major depressive disorder, in remission ([facility])     7. Chronic diastolic CHF (congestive heart failure) ([facility])     8. CKD (chronic kidney disease) stage 3, GFR 30-59 ml/min ([facility])           Chronic issues are stable overall. His dementias progressing. He has good days and bad days. No issues with wandering. No behavioral issues. He has a caregiver daily. His weight is up and I have encouraged him to work on daily exercise and weight control. A letter was given to his caregiver to help with these things.      Patient with daytime somnolence and fatigue.  Routine laboratory evaluation as above.  No change in medications.  Overnight oximetry to rule out sleep apnea.  If he should fail I would recommend nocturnal oxygen as I think he would be a poor candidate for CPAP.    Services suggested: No services required at this time  Health Care Screening: Age-appropriate preventive services Medicare covers discussed today and ordered if indicated.  Referrals offered: Community-based lifestyle interventions to reduce health risks and promote self-management and wellness, fall prevention, nutrition, physical activity, tobacco-use cessation, weight loss, and mental health services as per orders if indicated.    Discussion today about general wellness and lifestyle habits:    · Prevent falls and reduce trip hazards; Cautioned about securing or removing rugs.  · Have a working fire alarm and carbon monoxide detector;   · Engage in regular physical activity and social activities       Follow-up: 3  months

## 2020-02-28 NOTE — LETTER
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    I would like him to exercise for 15-30 minutes daily.  I recommend stationary bike or walking in the swimming pool.    I would like his diet to change.  He should eat less starches including bread, pasta, rice and potatoes.  I would like him to focus more on vegetables and lean protein including chicken and fish.  Eggs and beef are okay but try to limit.    I am going to arrange an overnight oxygen test.  This will be to test for sleep apnea.    If you have any questions or concerns, please don't hesitate to call.        Sincerely,        Phil Mcgee M.D.

## 2020-03-08 PROBLEM — F01.50 VASCULAR DEMENTIA WITHOUT BEHAVIORAL DISTURBANCE (HCC): Status: ACTIVE | Noted: 2020-03-08

## 2020-03-08 PROBLEM — K21.9 GASTROESOPHAGEAL REFLUX DISEASE WITHOUT ESOPHAGITIS: Status: ACTIVE | Noted: 2020-03-08

## 2020-03-09 DIAGNOSIS — R53.83 OTHER FATIGUE: ICD-10-CM

## 2020-03-09 DIAGNOSIS — R40.0 DAYTIME SOMNOLENCE: ICD-10-CM

## 2020-03-16 DIAGNOSIS — F33.40 RECURRENT MAJOR DEPRESSIVE DISORDER, IN REMISSION (HCC): ICD-10-CM

## 2020-03-16 RX ORDER — ESCITALOPRAM OXALATE 20 MG/1
20 TABLET ORAL DAILY
Qty: 90 TAB | Refills: 3 | Status: SHIPPED | OUTPATIENT
Start: 2020-03-16 | End: 2020-03-31 | Stop reason: SDUPTHER

## 2020-03-16 RX ORDER — ESCITALOPRAM OXALATE 20 MG/1
20 TABLET ORAL DAILY
Qty: 14 TAB | Refills: 0 | Status: SHIPPED | OUTPATIENT
Start: 2020-03-16 | End: 2020-03-16 | Stop reason: SDUPTHER

## 2020-03-30 ENCOUNTER — OFFICE VISIT (OUTPATIENT)
Dept: INTERNAL MEDICINE | Facility: IMAGING CENTER | Age: 85
End: 2020-03-30
Payer: MEDICARE

## 2020-03-30 VITALS
TEMPERATURE: 98.6 F | SYSTOLIC BLOOD PRESSURE: 110 MMHG | OXYGEN SATURATION: 95 % | DIASTOLIC BLOOD PRESSURE: 70 MMHG | RESPIRATION RATE: 12 BRPM | HEART RATE: 82 BPM

## 2020-03-30 DIAGNOSIS — Z78.9 POLST (PHYSICIAN ORDERS FOR LIFE-SUSTAINING TREATMENT): ICD-10-CM

## 2020-03-30 PROCEDURE — 99497 ADVNCD CARE PLAN 30 MIN: CPT | Performed by: INTERNAL MEDICINE

## 2020-03-30 NOTE — PROGRESS NOTES
Chief Complaint   Patient presents with   • Other     POLST       HISTORY OF THE PRESENT ILLNESS: Patient is a 89 y.o. male.     Patient comes in to complete pulsed form.  The form was reviewed.  Patient had multiple questions.  Although he has moderate dementia he appeared to have clear understanding of the importance of the questions and his reason for not wanting sustained measures.  After discussing the various parts of the form all questions were answered and it was completed.  Patient was given the original copy.  The patient was instructed to post on her refrigerator at home.    Allergies: Pcn [penicillins] and Sulfa drugs    Current Outpatient Medications Ordered in Epic   Medication Sig Dispense Refill   • escitalopram (LEXAPRO) 20 MG tablet Take 1 Tab by mouth every day. 90 Tab 3   • tamsulosin (FLOMAX) 0.4 MG capsule Take 1 Cap by mouth ONE-HALF HOUR AFTER BREAKFAST. 30 Cap 6   • esomeprazole (NEXIUM) 20 MG capsule TAKE 1 CAP BY MOUTH EVERY MORNING BEFORE BREAKFAST. 90 Cap 3   • losartan (COZAAR) 50 MG Tab Take 1 Tab by mouth 2 Times a Day. 180 Tab 3   • rosuvastatin (CRESTOR) 10 MG Tab Take 1 Tab by mouth every evening. 90 Tab 3   • hydroCHLOROthiazide (HYDRODIURIL) 25 MG Tab TAKE 1 TABLET BY MOUTH EVERY DAY 90 Tab 2   • hydroCHLOROthiazide (HYDRODIURIL) 25 MG Tab Take 1 Tab by mouth every day. 90 Tab 2   • naproxen (NAPROSYN) 500 MG Tab Take 1 Tab by mouth 2 times daily with meals as needed. 180 Tab 1   • Diclofenac Sodium 1 % Gel Apply 2 g to each hand 4 times daily as needed for arthritis pain. 100 g 3   • ipratropium (ATROVENT) 0.03 % Solution Spray 2 Sprays in nose every 12 hours. As needed for rhinitis 3 Bottle 3     No current Epic-ordered facility-administered medications on file.        Past medical history, social history and family history were reviewed from chart today    Review of systems: Per HPI.    Denies headache, chest pain, fever, chills, diarrhea, constipation, abdominal pain,  palpitations, depression   All others negative.     Exam: /70 (BP Location: Left arm, Patient Position: Sitting, BP Cuff Size: Adult)   Pulse 82   Temp 37 °C (98.6 °F) (Temporal)   Resp 12   SpO2 95%   General: Well-appearing. Well-developed. No signs of distress.  HEENT: Grossly normal. Oral cavity is pink and moist.  Neck: Supple without JVD or bruit.  Pulmonary: Clear with good breath sounds. Normal effort.  Cardiovascular: Regular. Carotid and radial pulses are intact.  Abdomen: Soft, nontender, nondistended. Spleen and liver are not enlarged.  Neurologic: Cranial nerves II through XII are grossly normal, alert and oriented x3      Diagnosis:  1. POLST (Physician Orders for Life-Sustaining Treatment)         Forms were completed

## 2020-03-31 DIAGNOSIS — F33.40 RECURRENT MAJOR DEPRESSIVE DISORDER, IN REMISSION (HCC): ICD-10-CM

## 2020-03-31 RX ORDER — ESCITALOPRAM OXALATE 20 MG/1
20 TABLET ORAL DAILY
Qty: 30 TAB | Refills: 0 | Status: SHIPPED | OUTPATIENT
Start: 2020-03-31 | End: 2020-04-02 | Stop reason: SDUPTHER

## 2020-04-01 ENCOUNTER — TELEMEDICINE2 (OUTPATIENT)
Dept: INTERNAL MEDICINE | Facility: IMAGING CENTER | Age: 85
End: 2020-04-01
Payer: MEDICARE

## 2020-04-01 DIAGNOSIS — G47.34 NOCTURNAL HYPOXEMIA: ICD-10-CM

## 2020-04-01 DIAGNOSIS — E66.9 OBESITY (BMI 30-39.9): ICD-10-CM

## 2020-04-01 DIAGNOSIS — R40.0 DAYTIME SOMNOLENCE: ICD-10-CM

## 2020-04-01 DIAGNOSIS — R53.83 OTHER FATIGUE: ICD-10-CM

## 2020-04-01 DIAGNOSIS — F02.80 LATE ONSET ALZHEIMER'S DISEASE WITHOUT BEHAVIORAL DISTURBANCE (HCC): ICD-10-CM

## 2020-04-01 DIAGNOSIS — G30.1 LATE ONSET ALZHEIMER'S DISEASE WITHOUT BEHAVIORAL DISTURBANCE (HCC): ICD-10-CM

## 2020-04-01 DIAGNOSIS — G47.33 OBSTRUCTIVE SLEEP APNEA: ICD-10-CM

## 2020-04-01 PROCEDURE — 99442 PR PHYSICIAN TELEPHONE EVALUATION 11-20 MIN: CPT | Performed by: INTERNAL MEDICINE

## 2020-04-01 NOTE — PROGRESS NOTES
Telephone Appointment Visit   As a means of avoiding spread of COVID-19, this visit is being conducted by telephone. This telephone visit was initiated by the patient and they verbally consented.    Time at start of call: 9:30 AM    Reason for Call:  Lab Follow-up -follow-up on overnight oximetry lab results    Patient Comments / History:   I spoke with the patient's wife.  The patient has moderate to advanced dementia.  Although he is present in the moment when you talk to him he has very significant short-term memory issues and she is primarily managing his care.  They also have caregivers that are in the house daily.    We discussed his recent overnight asymmetry test.  He had significant's hypoxemia.  He spent over 3 hours with a saturation less than 88%.  His low was 78%.  His average was 88.8%.    Labs / Images Reviewed   Reviewed overnight oximetry results from March 23, 2020    Assessment and Plan:     Nocturnal hypoxemia  Presumed obstructive sleep apnea  Obesity  Alzheimer's dementia    We discussed the diagnosis and I recommended supplemental oxygen.  I do not think he is a candidate for CPAP or BiPAP because of his dementia.  I do think he would be compliant and a good candidate for oxygen concentrator.    Follow-up: Repeat over oximetry in 1-2 months.    Time at end of call: 9:45 AM  Total Time Spent: 11-20 minutes    Phil Mcgee M.D.

## 2020-04-02 DIAGNOSIS — F33.40 RECURRENT MAJOR DEPRESSIVE DISORDER, IN REMISSION (HCC): ICD-10-CM

## 2020-04-02 RX ORDER — ESCITALOPRAM OXALATE 20 MG/1
20 TABLET ORAL DAILY
Qty: 90 TAB | Refills: 3 | Status: SHIPPED | OUTPATIENT
Start: 2020-04-02 | End: 2020-06-26 | Stop reason: SDUPTHER

## 2020-04-25 DIAGNOSIS — I10 ESSENTIAL HYPERTENSION: ICD-10-CM

## 2020-04-27 DIAGNOSIS — I10 ESSENTIAL HYPERTENSION: ICD-10-CM

## 2020-04-27 RX ORDER — ROSUVASTATIN CALCIUM 10 MG/1
TABLET, COATED ORAL
Qty: 90 TAB | Refills: 3 | Status: SHIPPED | OUTPATIENT
Start: 2020-04-27 | End: 2020-09-07

## 2020-04-27 RX ORDER — LOSARTAN POTASSIUM 50 MG/1
TABLET ORAL
Qty: 180 TAB | Refills: 3 | Status: SHIPPED | OUTPATIENT
Start: 2020-04-27 | End: 2020-09-07

## 2020-04-27 RX ORDER — HYDROCHLOROTHIAZIDE 25 MG/1
25 TABLET ORAL
Qty: 90 TAB | Refills: 3 | Status: ON HOLD | OUTPATIENT
Start: 2020-04-27 | End: 2020-09-09

## 2020-06-03 ENCOUNTER — OFFICE VISIT (OUTPATIENT)
Dept: INTERNAL MEDICINE | Facility: IMAGING CENTER | Age: 85
End: 2020-06-03
Payer: MEDICARE

## 2020-06-03 VITALS
SYSTOLIC BLOOD PRESSURE: 122 MMHG | RESPIRATION RATE: 12 BRPM | HEART RATE: 80 BPM | OXYGEN SATURATION: 94 % | DIASTOLIC BLOOD PRESSURE: 80 MMHG | TEMPERATURE: 97.1 F

## 2020-06-03 DIAGNOSIS — F02.80 LATE ONSET ALZHEIMER'S DISEASE WITHOUT BEHAVIORAL DISTURBANCE (HCC): ICD-10-CM

## 2020-06-03 DIAGNOSIS — F33.0 MILD EPISODE OF RECURRENT MAJOR DEPRESSIVE DISORDER (HCC): ICD-10-CM

## 2020-06-03 DIAGNOSIS — G30.1 LATE ONSET ALZHEIMER'S DISEASE WITHOUT BEHAVIORAL DISTURBANCE (HCC): ICD-10-CM

## 2020-06-03 PROCEDURE — 99213 OFFICE O/P EST LOW 20 MIN: CPT | Performed by: INTERNAL MEDICINE

## 2020-06-15 NOTE — PROGRESS NOTES
Chief Complaint   Patient presents with   • Depression   • Dementia       HISTORY OF THE PRESENT ILLNESS: Patient is a 89 y.o. male.     Pleasant 89-year-old male who comes in with his wife and caregiver. He has moderate Alzheimer's type dementia. His caregiver believes he is been sleeping more. He also feel that he is more depressed. He is been more tearful. he is a history depression and is currently onLexapro. He is seen multiple psychiatrist and therapist in the past. He has been less active lately because of the epidemic    Allergies: Pcn [penicillins] and Sulfa drugs    Current Outpatient Medications Ordered in Epic   Medication Sig Dispense Refill   • rosuvastatin (CRESTOR) 10 MG Tab TAKE 1 TABLET EVERY EVENING 90 Tab 3   • losartan (COZAAR) 50 MG Tab TAKE 1 TABLET TWICE A  Tab 3   • hydroCHLOROthiazide (HYDRODIURIL) 25 MG Tab Take 1 Tab by mouth every day. 90 Tab 3   • escitalopram (LEXAPRO) 20 MG tablet Take 1 Tab by mouth every day. 90 Tab 3   • tamsulosin (FLOMAX) 0.4 MG capsule Take 1 Cap by mouth ONE-HALF HOUR AFTER BREAKFAST. 30 Cap 6   • esomeprazole (NEXIUM) 20 MG capsule TAKE 1 CAP BY MOUTH EVERY MORNING BEFORE BREAKFAST. 90 Cap 3   • hydroCHLOROthiazide (HYDRODIURIL) 25 MG Tab TAKE 1 TABLET BY MOUTH EVERY DAY 90 Tab 2   • naproxen (NAPROSYN) 500 MG Tab Take 1 Tab by mouth 2 times daily with meals as needed. 180 Tab 1   • Diclofenac Sodium 1 % Gel Apply 2 g to each hand 4 times daily as needed for arthritis pain. 100 g 3   • ipratropium (ATROVENT) 0.03 % Solution Spray 2 Sprays in nose every 12 hours. As needed for rhinitis 3 Bottle 3     No current Epic-ordered facility-administered medications on file.        Past medical history, social history and family history were reviewed from chart today    Review of systems: Per HPI.    Denies headache, chest pain, fever, chills, diarrhea, constipation, abdominal pain, palpitations, depression   All others negative.     Exam: /80 (BP Location:  Left arm, Patient Position: Sitting, BP Cuff Size: Adult)   Pulse 80   Temp 36.2 °C (97.1 °F) (Temporal)   Resp 12   SpO2 94%   General: Well-appearing. Well-developed. No signs of distress.Friendly, normal mood.   HEENT: Grossly normal. Oral cavity is pink and moist.  Neck: Supple without JVD or bruit.  Pulmonary: Clear with good breath sounds. Normal effort.  Cardiovascular: Regular. Carotid and radial pulses are intact.  Abdomen: Soft, nontender, nondistended. Spleen and liver are not enlarged.  Neurologic: Cranial nerves II through XII are grossly normal, alert and oriented x3      Diagnosis:  1. Mild episode of recurrent major depressive disorder (HCC)     2. Late onset Alzheimer's disease without behavioral disturbance (HCC)         Patient with possible infection but I suspectHe has ongoing depressive symptoms complicated by his dementia. We discussed the possibility of adding medications to treat dementia. I do not believe they will offer significant benefit but are an alternative to changing antidepressant medications. I also encouraged routine activity. He will begin daily walks and will resume water volleyball. If he does not show improvement consider adding Wellbutrin.

## 2020-06-26 DIAGNOSIS — F33.40 RECURRENT MAJOR DEPRESSIVE DISORDER, IN REMISSION (HCC): ICD-10-CM

## 2020-06-26 RX ORDER — ESCITALOPRAM OXALATE 20 MG/1
20 TABLET ORAL DAILY
Qty: 90 TAB | Refills: 3 | Status: SHIPPED | OUTPATIENT
Start: 2020-06-26 | End: 2020-07-01 | Stop reason: SDUPTHER

## 2020-07-01 DIAGNOSIS — F33.40 RECURRENT MAJOR DEPRESSIVE DISORDER, IN REMISSION (HCC): ICD-10-CM

## 2020-07-01 RX ORDER — ESCITALOPRAM OXALATE 20 MG/1
20 TABLET ORAL DAILY
Qty: 30 TAB | Refills: 0 | Status: SHIPPED | OUTPATIENT
Start: 2020-07-01 | End: 2021-03-11 | Stop reason: DRUGHIGH

## 2020-08-24 RX ORDER — TAMSULOSIN HYDROCHLORIDE 0.4 MG/1
0.4 CAPSULE ORAL
Qty: 90 CAP | Refills: 2 | Status: SHIPPED | OUTPATIENT
Start: 2020-08-24 | End: 2021-05-07 | Stop reason: SDUPTHER

## 2020-09-07 ENCOUNTER — APPOINTMENT (OUTPATIENT)
Dept: RADIOLOGY | Facility: MEDICAL CENTER | Age: 85
DRG: 149 | End: 2020-09-07
Attending: EMERGENCY MEDICINE
Payer: MEDICARE

## 2020-09-07 ENCOUNTER — HOSPITAL ENCOUNTER (INPATIENT)
Facility: MEDICAL CENTER | Age: 85
LOS: 2 days | DRG: 149 | End: 2020-09-09
Attending: EMERGENCY MEDICINE | Admitting: INTERNAL MEDICINE
Payer: MEDICARE

## 2020-09-07 DIAGNOSIS — Z95.0 STATUS POST CARDIAC PACEMAKER PROCEDURE: ICD-10-CM

## 2020-09-07 PROBLEM — R42 DIZZINESS: Status: ACTIVE | Noted: 2020-09-07

## 2020-09-07 PROBLEM — I95.9 HYPOTENSION: Status: ACTIVE | Noted: 2020-09-07

## 2020-09-07 LAB
ALBUMIN SERPL BCP-MCNC: 3.8 G/DL (ref 3.2–4.9)
ALBUMIN/GLOB SERPL: 1.5 G/DL
ALP SERPL-CCNC: 45 U/L (ref 30–99)
ALT SERPL-CCNC: 13 U/L (ref 2–50)
ANION GAP SERPL CALC-SCNC: 15 MMOL/L (ref 7–16)
APTT PPP: 25.1 SEC (ref 24.7–36)
AST SERPL-CCNC: 13 U/L (ref 12–45)
BASOPHILS # BLD AUTO: 0.7 % (ref 0–1.8)
BASOPHILS # BLD: 0.06 K/UL (ref 0–0.12)
BILIRUB SERPL-MCNC: 0.3 MG/DL (ref 0.1–1.5)
BUN SERPL-MCNC: 34 MG/DL (ref 8–22)
CALCIUM SERPL-MCNC: 8.7 MG/DL (ref 8.5–10.5)
CHLORIDE SERPL-SCNC: 101 MMOL/L (ref 96–112)
CO2 SERPL-SCNC: 20 MMOL/L (ref 20–33)
CREAT SERPL-MCNC: 1.68 MG/DL (ref 0.5–1.4)
EKG IMPRESSION: NORMAL
EOSINOPHIL # BLD AUTO: 0.38 K/UL (ref 0–0.51)
EOSINOPHIL NFR BLD: 4.5 % (ref 0–6.9)
ERYTHROCYTE [DISTWIDTH] IN BLOOD BY AUTOMATED COUNT: 51.6 FL (ref 35.9–50)
GLOBULIN SER CALC-MCNC: 2.6 G/DL (ref 1.9–3.5)
GLUCOSE SERPL-MCNC: 114 MG/DL (ref 65–99)
HCT VFR BLD AUTO: 40.1 % (ref 42–52)
HGB BLD-MCNC: 12.9 G/DL (ref 14–18)
IMM GRANULOCYTES # BLD AUTO: 0.03 K/UL (ref 0–0.11)
IMM GRANULOCYTES NFR BLD AUTO: 0.4 % (ref 0–0.9)
INR PPP: 1.01 (ref 0.87–1.13)
LIPASE SERPL-CCNC: 15 U/L (ref 11–82)
LYMPHOCYTES # BLD AUTO: 1.27 K/UL (ref 1–4.8)
LYMPHOCYTES NFR BLD: 15.1 % (ref 22–41)
MCH RBC QN AUTO: 29.7 PG (ref 27–33)
MCHC RBC AUTO-ENTMCNC: 32.2 G/DL (ref 33.7–35.3)
MCV RBC AUTO: 92.2 FL (ref 81.4–97.8)
MONOCYTES # BLD AUTO: 0.95 K/UL (ref 0–0.85)
MONOCYTES NFR BLD AUTO: 11.3 % (ref 0–13.4)
NEUTROPHILS # BLD AUTO: 5.7 K/UL (ref 1.82–7.42)
NEUTROPHILS NFR BLD: 68 % (ref 44–72)
NRBC # BLD AUTO: 0 K/UL
NRBC BLD-RTO: 0 /100 WBC
NT-PROBNP SERPL IA-MCNC: 258 PG/ML (ref 0–125)
PLATELET # BLD AUTO: 193 K/UL (ref 164–446)
PMV BLD AUTO: 12.1 FL (ref 9–12.9)
POTASSIUM SERPL-SCNC: 3.7 MMOL/L (ref 3.6–5.5)
PROT SERPL-MCNC: 6.4 G/DL (ref 6–8.2)
PROTHROMBIN TIME: 13.6 SEC (ref 12–14.6)
RBC # BLD AUTO: 4.35 M/UL (ref 4.7–6.1)
SODIUM SERPL-SCNC: 136 MMOL/L (ref 135–145)
TROPONIN T SERPL-MCNC: 25 NG/L (ref 6–19)
WBC # BLD AUTO: 8.4 K/UL (ref 4.8–10.8)

## 2020-09-07 PROCEDURE — 99223 1ST HOSP IP/OBS HIGH 75: CPT | Mod: AI | Performed by: INTERNAL MEDICINE

## 2020-09-07 PROCEDURE — 99222 1ST HOSP IP/OBS MODERATE 55: CPT | Performed by: INTERNAL MEDICINE

## 2020-09-07 PROCEDURE — 80053 COMPREHEN METABOLIC PANEL: CPT

## 2020-09-07 PROCEDURE — A9270 NON-COVERED ITEM OR SERVICE: HCPCS | Performed by: INTERNAL MEDICINE

## 2020-09-07 PROCEDURE — 99285 EMERGENCY DEPT VISIT HI MDM: CPT

## 2020-09-07 PROCEDURE — 700102 HCHG RX REV CODE 250 W/ 637 OVERRIDE(OP): Performed by: INTERNAL MEDICINE

## 2020-09-07 PROCEDURE — 700105 HCHG RX REV CODE 258: Performed by: EMERGENCY MEDICINE

## 2020-09-07 PROCEDURE — 83690 ASSAY OF LIPASE: CPT

## 2020-09-07 PROCEDURE — 85610 PROTHROMBIN TIME: CPT

## 2020-09-07 PROCEDURE — C9803 HOPD COVID-19 SPEC COLLECT: HCPCS | Performed by: INTERNAL MEDICINE

## 2020-09-07 PROCEDURE — 85730 THROMBOPLASTIN TIME PARTIAL: CPT

## 2020-09-07 PROCEDURE — 71045 X-RAY EXAM CHEST 1 VIEW: CPT

## 2020-09-07 PROCEDURE — 83880 ASSAY OF NATRIURETIC PEPTIDE: CPT

## 2020-09-07 PROCEDURE — 700111 HCHG RX REV CODE 636 W/ 250 OVERRIDE (IP): Performed by: EMERGENCY MEDICINE

## 2020-09-07 PROCEDURE — 700117 HCHG RX CONTRAST REV CODE 255: Performed by: EMERGENCY MEDICINE

## 2020-09-07 PROCEDURE — 770020 HCHG ROOM/CARE - TELE (206)

## 2020-09-07 PROCEDURE — 74175 CTA ABDOMEN W/CONTRAST: CPT

## 2020-09-07 PROCEDURE — 96374 THER/PROPH/DIAG INJ IV PUSH: CPT

## 2020-09-07 PROCEDURE — 36415 COLL VENOUS BLD VENIPUNCTURE: CPT

## 2020-09-07 PROCEDURE — 700105 HCHG RX REV CODE 258: Performed by: INTERNAL MEDICINE

## 2020-09-07 PROCEDURE — 93005 ELECTROCARDIOGRAM TRACING: CPT | Performed by: EMERGENCY MEDICINE

## 2020-09-07 PROCEDURE — 85025 COMPLETE CBC W/AUTO DIFF WBC: CPT

## 2020-09-07 PROCEDURE — 84484 ASSAY OF TROPONIN QUANT: CPT

## 2020-09-07 RX ORDER — ONDANSETRON 2 MG/ML
4 INJECTION INTRAMUSCULAR; INTRAVENOUS ONCE
Status: COMPLETED | OUTPATIENT
Start: 2020-09-07 | End: 2020-09-07

## 2020-09-07 RX ORDER — TAMSULOSIN HYDROCHLORIDE 0.4 MG/1
0.4 CAPSULE ORAL
Status: DISCONTINUED | OUTPATIENT
Start: 2020-09-08 | End: 2020-09-09 | Stop reason: HOSPADM

## 2020-09-07 RX ORDER — AMOXICILLIN 250 MG
2 CAPSULE ORAL 2 TIMES DAILY
Status: DISCONTINUED | OUTPATIENT
Start: 2020-09-07 | End: 2020-09-09 | Stop reason: HOSPADM

## 2020-09-07 RX ORDER — SODIUM CHLORIDE 9 MG/ML
1000 INJECTION, SOLUTION INTRAVENOUS CONTINUOUS
Status: DISCONTINUED | OUTPATIENT
Start: 2020-09-07 | End: 2020-09-08

## 2020-09-07 RX ORDER — OMEPRAZOLE 20 MG/1
20 CAPSULE, DELAYED RELEASE ORAL
Status: DISCONTINUED | OUTPATIENT
Start: 2020-09-07 | End: 2020-09-09 | Stop reason: HOSPADM

## 2020-09-07 RX ORDER — BISACODYL 10 MG
10 SUPPOSITORY, RECTAL RECTAL
Status: DISCONTINUED | OUTPATIENT
Start: 2020-09-07 | End: 2020-09-09 | Stop reason: HOSPADM

## 2020-09-07 RX ORDER — POLYETHYLENE GLYCOL 3350 17 G/17G
1 POWDER, FOR SOLUTION ORAL
Status: DISCONTINUED | OUTPATIENT
Start: 2020-09-07 | End: 2020-09-09 | Stop reason: HOSPADM

## 2020-09-07 RX ORDER — ACETAMINOPHEN 325 MG/1
650 TABLET ORAL EVERY 6 HOURS PRN
Status: DISCONTINUED | OUTPATIENT
Start: 2020-09-07 | End: 2020-09-09 | Stop reason: HOSPADM

## 2020-09-07 RX ORDER — ESCITALOPRAM OXALATE 10 MG/1
20 TABLET ORAL DAILY
Status: DISCONTINUED | OUTPATIENT
Start: 2020-09-08 | End: 2020-09-09 | Stop reason: HOSPADM

## 2020-09-07 RX ORDER — SODIUM CHLORIDE 9 MG/ML
1000 INJECTION, SOLUTION INTRAVENOUS ONCE
Status: COMPLETED | OUTPATIENT
Start: 2020-09-07 | End: 2020-09-07

## 2020-09-07 RX ADMIN — SODIUM CHLORIDE 1000 ML: 9 INJECTION, SOLUTION INTRAVENOUS at 15:00

## 2020-09-07 RX ADMIN — IOHEXOL 100 ML: 350 INJECTION, SOLUTION INTRAVENOUS at 16:45

## 2020-09-07 RX ADMIN — SODIUM CHLORIDE 1000 ML: 9 INJECTION, SOLUTION INTRAVENOUS at 22:19

## 2020-09-07 RX ADMIN — ONDANSETRON 4 MG: 2 INJECTION INTRAMUSCULAR; INTRAVENOUS at 15:00

## 2020-09-07 RX ADMIN — OMEPRAZOLE 20 MG: 20 CAPSULE, DELAYED RELEASE ORAL at 22:18

## 2020-09-07 ASSESSMENT — ENCOUNTER SYMPTOMS
FEVER: 0
HEADACHES: 0
CONSTIPATION: 0
BACK PAIN: 0
SPUTUM PRODUCTION: 0
PHOTOPHOBIA: 0
DIARRHEA: 0
MYALGIAS: 0
SPEECH CHANGE: 0
VOMITING: 0
HALLUCINATIONS: 0
PALPITATIONS: 0
NAUSEA: 1
FOCAL WEAKNESS: 0
SENSORY CHANGE: 0
DIZZINESS: 1
SHORTNESS OF BREATH: 0
DOUBLE VISION: 0
ABDOMINAL PAIN: 0
WEIGHT LOSS: 0
ORTHOPNEA: 0
EYE PAIN: 0
TINGLING: 0
TREMORS: 0
NECK PAIN: 0
CHILLS: 0
COUGH: 0
BLURRED VISION: 0

## 2020-09-07 ASSESSMENT — COGNITIVE AND FUNCTIONAL STATUS - GENERAL
CLIMB 3 TO 5 STEPS WITH RAILING: A LOT
MOVING FROM LYING ON BACK TO SITTING ON SIDE OF FLAT BED: A LITTLE
SUGGESTED CMS G CODE MODIFIER MOBILITY: CK
STANDING UP FROM CHAIR USING ARMS: A LOT
DRESSING REGULAR LOWER BODY CLOTHING: A LITTLE
MOBILITY SCORE: 17
MOVING TO AND FROM BED TO CHAIR: A LITTLE
PERSONAL GROOMING: A LITTLE
DRESSING REGULAR UPPER BODY CLOTHING: A LOT
WALKING IN HOSPITAL ROOM: A LITTLE
HELP NEEDED FOR BATHING: A LOT
TOILETING: A LOT
SUGGESTED CMS G CODE MODIFIER DAILY ACTIVITY: CK
DAILY ACTIVITIY SCORE: 16

## 2020-09-07 ASSESSMENT — PATIENT HEALTH QUESTIONNAIRE - PHQ9
1. LITTLE INTEREST OR PLEASURE IN DOING THINGS: NOT AT ALL
SUM OF ALL RESPONSES TO PHQ9 QUESTIONS 1 AND 2: 0
2. FEELING DOWN, DEPRESSED, IRRITABLE, OR HOPELESS: NOT AT ALL

## 2020-09-07 ASSESSMENT — LIFESTYLE VARIABLES: SUBSTANCE_ABUSE: 0

## 2020-09-07 ASSESSMENT — FIBROSIS 4 INDEX
FIB4 SCORE: 1.7
FIB4 SCORE: 1.7
FIB4 SCORE: 2.24

## 2020-09-07 NOTE — ED TRIAGE NOTES
"Anthony Coleman  Chief Complaint   Patient presents with   • Nausea   • Syncope     Pt biba from home, after pt \"rolled off couch\" suddenly per wife and had sudden onset of nausea.  VSS, no acute distress. Pt received nitro x 2 PTA and now hypotensive.  Also received zofran and 325mg ASA PTA.    stemi canceled by cardiology and pt to RD 9.  Wife at bedside.  Pt medicated per MAR for hypotension and return of nausea.  Pt denies pain.    Report given to SCARLETT Copeland  "

## 2020-09-07 NOTE — ED NOTES
Med rec updated and complete. Allergies reviewed. Attempted to interview pt/family at bedside. Not able to clarify medications or lase doses taken with pt. Pt was unable to verify reactions to antibiotics listed in allergies.      Placed call to listed home pharmacy to verify last filled an picked up.      Home pharmacy [facility]

## 2020-09-07 NOTE — CONSULTS
Reason of Consult: Prehospital STEMI activation    Consulting Physician: Dr. Panda, ERP    HPI:  89-year-old male with a history of PPM, coronary disease status post CABG remotely, aortic stenosis without reassessment since last assessed by Dr. Mahan in 2012 at which point was mild to moderate.  Called EMS apparently for nausea and vomiting.  He is having memory difficulties and cannot actually recall his reason himself for calling EMS.  Denies any current or antecedent chest pain and this is corroborated by the EMS personnel.  In the field ECG was activated as a STEMI prehospital.  On arrival he denies any chest discomfort and his ECG as well as the rhythm and ECG strips obtained by EMS all show ventricularly paced rhythm.  There are a couple of fusion beats on the current ECG in the emergency department.  There is no evidence of coronary ischemia.  Non-smoker does not drink excessively or do drugs.  No family history of precocious CAD.    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[facility])    • VENTRAL HERNIA Repaired       Social History     Socioeconomic History   • Marital status:      Spouse name: Not on file   • Number of children: Not on file   • Years of education: Not on file   • Highest education level: Not on file   Occupational History   • Not on file   Social Needs   • Financial resource strain: Not on file   • Food insecurity     Worry: Not on file     Inability: Not on file   • Transportation needs     Medical: Not on file     Non-medical: Not on file   Tobacco Use   • Smoking status: Never Smoker   • Smokeless tobacco:  Never Used   Substance and Sexual Activity   • Alcohol use: Yes     Alcohol/week: 7.0 oz     Types: 14 drink(s) per week     Comment: daily, 2 glasses of wine   • Drug use: No   • Sexual activity: Not on file   Lifestyle   • Physical activity     Days per week: Not on file     Minutes per session: Not on file   • Stress: Not on file   Relationships   • Social connections     Talks on phone: Not on file     Gets together: Not on file     Attends Congregation service: Not on file     Active member of club or organization: Not on file     Attends meetings of clubs or organizations: Not on file     Relationship status: Not on file   • Intimate partner violence     Fear of current or ex partner: Not on file     Emotionally abused: Not on file     Physically abused: Not on file     Forced sexual activity: Not on file   Other Topics Concern   • Not on file   Social History Narrative   • Not on file       No current facility-administered medications on file prior to encounter.      Current Outpatient Medications on File Prior to Encounter   Medication Sig Dispense Refill   • tamsulosin (FLOMAX) 0.4 MG capsule TAKE 1 CAP BY MOUTH ONE-HALF HOUR AFTER BREAKFAST. 90 Cap 2   • escitalopram (LEXAPRO) 20 MG tablet Take 1 Tab by mouth every day. 30 Tab 0   • rosuvastatin (CRESTOR) 10 MG Tab TAKE 1 TABLET EVERY EVENING 90 Tab 3   • losartan (COZAAR) 50 MG Tab TAKE 1 TABLET TWICE A  Tab 3   • hydroCHLOROthiazide (HYDRODIURIL) 25 MG Tab Take 1 Tab by mouth every day. 90 Tab 3   • esomeprazole (NEXIUM) 20 MG capsule TAKE 1 CAP BY MOUTH EVERY MORNING BEFORE BREAKFAST. 90 Cap 3   • hydroCHLOROthiazide (HYDRODIURIL) 25 MG Tab TAKE 1 TABLET BY MOUTH EVERY DAY 90 Tab 2   • naproxen (NAPROSYN) 500 MG Tab Take 1 Tab by mouth 2 times daily with meals as needed. 180 Tab 1   • Diclofenac Sodium 1 % Gel Apply 2 g to each hand 4 times daily as needed for arthritis pain. 100 g 3   • ipratropium (ATROVENT) 0.03 % Solution Spray 2 Sprays in nose  every 12 hours. As needed for rhinitis 3 Bottle 3       No current facility-administered medications for this encounter.      Last reviewed on 6/14/2020  8:44 PM by Phil Mcgee M.D.      Pcn [penicillins] and Sulfa drugs    No family history on file.    ROS: As per HPI all other systems reviewed and negative     Physical Exam   There were no vitals taken for this visit.    Constitutional: Appears well-developed.   HENT: Normocephalic and atraumatic. No scleral icterus.   Neck: No JVD present.   Cardiovascular: Normal rate. Exam reveals no gallop and no friction rub.  3/6 systolic ejection murmur heard.   Pulmonary/Chest: Coarse   Abdominal: S/NT/ND BS+   Musculoskeletal:  Pulses present. No atrophy. Strength normal.  Extremities: Exhibits no edema. No clubbing or cyanosis.   Skin: Skin is warm and dry.   Neuro: Non-focal, CN 2-12 intact grossly    No intake or output data in the 24 hours ending 09/07/20 1450                              EKG (9/7/2020 ):  I have personally reviewed the EKG this visit and discussed with the patient. It shows a sensed V paced with fusion beats.    Imaging reviewed    Impressions:  1.  Nausea  2.  Presence of permanent pacemaker  3.  History of aortic stenosis  4.  Coronary disease status post remote CABG  5.  Memory difficulties  6.  DNR/DNI, POLST on file in media 3/2020  7.  Comfort focused treatment goals, POLST on file in media 3/2020      Recommendations:  There is no evidence for acute coronary syndrome.  Regardless he wishes to be DNR/DNI and have comfort focused treatment goals of care.  He is therefore not a candidate for invasive cardiac procedures due to this.  He is having no cardiac complaints.  His ECG was paced.     Recommend addressing his reason for admission and medical therapy and have him follow-up with his usual cardiologist.  He does not follow with [facility].    Discussed with the referring physician and bedside nursing.    Thank you for the  consultation. Cardiology will sign off. Please call with any questions.    Conrad Ventura MD, FACC, [facility]  Division of Interventional Cardiology  [facility]

## 2020-09-07 NOTE — ED PROVIDER NOTES
ED Provider Note    CHIEF COMPLAINT  Chief Complaint   Patient presents with   • Nausea   • Syncope       HPI  Anthony Coleman is a 89 y.o. male who presents to the emergency department with complaint of nausea and near syncope.  The patient was sitting down Gatorade watching baseball game, he had sudden onset of nausea and slight chest discomfort, he rolled off onto the ground.  He was unable to be lifted from the ground and EMS was called.  EMS thought the patient ST elevation myocardial infarction on evaluation.  The patient initially had a blood pressure 90 systolic 1-1 15 systolic therefore he received 2 nitroglycerin tablets and brought to our facility.  Prior to arrival, the patient became hypotensive after receiving nitroglycerin.  While in department, the patient denies severe chest pain his complains of slight nausea, [place] lightness, dizziness, back pain, abdominal pain, chest pain, fever, shakes, chills, sweats and cough.  Is a very poor historian  REVIEW OF SYSTEMS  Positives as above. 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[facility])    • VENTRAL HERNIA Repaired       FAMILY HISTORY  Noncontributory    SOCIAL HISTORY  Social History     Socioeconomic History   • Marital status:      Spouse name: Not on file   • Number of children: Not on file   • Years of education: Not on file   • Highest education level: Not on file   Occupational History   • Not on file   Social Needs   •  Financial resource strain: Not on file   • Food insecurity     Worry: Not on file     Inability: Not on file   • Transportation needs     Medical: Not on file     Non-medical: Not on file   Tobacco Use   • Smoking status: Never Smoker   • Smokeless tobacco: Never Used   Substance and Sexual Activity   • Alcohol use: Yes     Alcohol/week: 7.0 oz     Types: 14 drink(s) per week     Comment: daily, 2 glasses of wine   • Drug use: No   • Sexual activity: Not on file   Lifestyle   • Physical activity     Days per week: Not on file     Minutes per session: Not on file   • Stress: Not on file   Relationships   • Social connections     Talks on phone: Not on file     Gets together: Not on file     Attends [employer] service: Not on file     Active member of club or organization: Not on file     Attends meetings of clubs or organizations: Not on file     Relationship status: Not on file   • Intimate partner violence     Fear of current or ex partner: Not on file     Emotionally abused: Not on file     Physically abused: Not on file     Forced sexual activity: Not on file   Other Topics Concern   • Not on file   Social History Narrative   • Not on file       SURGICAL HISTORY  Past Surgical History:   Procedure Laterality Date   • LAPAROSCOPY  1/8/2013    Performed by Rodrick Ruiz M.D. at SURGERY [facility] ORS   • MASS EXCISION GENERAL  1/8/2013    Performed by Rodrick Ruiz M.D. at SURGERY [facility] ORS   • SHOULDER ARTHROPLASTY TOTAL  2000   • CORONARY ARTERY BYPASS, 3     • HIP ARTHROPLASTY TOTAL      bilateral   • VENTRAL HERNIA REPAIR         CURRENT MEDICATIONS  Home Medications     Reviewed by Harpal Coombs on 09/07/20 at 1606  Med List Status: Complete   Medication Last Dose Status   escitalopram (LEXAPRO) 20 MG tablet unknown Active   esomeprazole (NEXIUM) 20 MG capsule unknown Active   hydroCHLOROthiazide (HYDRODIURIL) 25 MG Tab unknown Active   tamsulosin (FLOMAX) 0.4 MG capsule unknown Active  "               ALLERGIES  Allergies   Allergen Reactions   • Pcn [Penicillins] Itching   • Sulfa Drugs Itching       PHYSICAL EXAM  VITAL SIGNS: BP (!) 96/57   Pulse 78   Temp (!) 35.6 °C (96 °F) (Temporal)   Resp 18   Ht 1.727 m (5' 8\")   Wt 93.9 kg (207 lb)   SpO2 98%   BMI 31.47 kg/m²      Constitutional: Well developed, Well nourished, No acute distress, Non-toxic appearance.   Eyes: PERRLA, EOMI, Conjunctiva normal, No discharge.   Cardiovascular: Normal heart rate, Normal rhythm, No murmurs, No rubs, No gallops, and intact distal pulses.   Thorax & Lungs:  No respiratory distress, no rales, no rhonchi, No wheezing, No chest wall tenderness, pacemaker in left chest wall.   Abdomen: Bowel sounds normal, Soft, No tenderness, negative Villalpando sign, no guarding, No rebound, No pulsatile masses.   Skin: Warm, Dry, No erythema, No rash.   Extremities: Full range of motion, no deformity, no edema.  Neurologic: Alert & oriented x 3, No focal deficits noted, acting appropriately on exam.  Psychiatric: Affect normal for clinical presentation.      RADIOLOGY/PROCEDURES  CT-CTA COMPLETE THORACOABDOMINAL AORTA   Final Result      1.  No thoracic or abdominal aortic aneurysm or dissection.   2.  Prosthetic aortic valve.   3.  No pneumonia or pneumothorax.   4.  Markedly atrophic LEFT kidney again seen.            DX-CHEST-PORTABLE (1 VIEW)   Final Result      Mild left basilar atelectasis.        Results for orders placed or performed during the hospital encounter of 09/07/20   TROPONIN   Result Value Ref Range    Troponin T 25 (H) 6 - 19 ng/L   proBrain Natriuretic Peptide, NT   Result Value Ref Range    NT-proBNP 258 (H) 0 - 125 pg/mL   CBC WITH DIFFERENTIAL   Result Value Ref Range    WBC 8.4 4.8 - 10.8 K/uL    RBC 4.35 (L) 4.70 - 6.10 M/uL    Hemoglobin 12.9 (L) 14.0 - 18.0 g/dL    Hematocrit 40.1 (L) 42.0 - 52.0 %    MCV 92.2 81.4 - 97.8 fL    MCH 29.7 27.0 - 33.0 pg    MCHC 32.2 (L) 33.7 - 35.3 g/dL    RDW 51.6 " (H) 35.9 - 50.0 fL    Platelet Count 193 164 - 446 K/uL    MPV 12.1 9.0 - 12.9 fL    Neutrophils-Polys 68.00 44.00 - 72.00 %    Lymphocytes 15.10 (L) 22.00 - 41.00 %    Monocytes 11.30 0.00 - 13.40 %    Eosinophils 4.50 0.00 - 6.90 %    Basophils 0.70 0.00 - 1.80 %    Immature Granulocytes 0.40 0.00 - 0.90 %    Nucleated RBC 0.00 /100 WBC    Neutrophils (Absolute) 5.70 1.82 - 7.42 K/uL    Lymphs (Absolute) 1.27 1.00 - 4.80 K/uL    Monos (Absolute) 0.95 (H) 0.00 - 0.85 K/uL    Eos (Absolute) 0.38 0.00 - 0.51 K/uL    Baso (Absolute) 0.06 0.00 - 0.12 K/uL    Immature Granulocytes (abs) 0.03 0.00 - 0.11 K/uL    NRBC (Absolute) 0.00 K/uL   COMP METABOLIC PANEL   Result Value Ref Range    Sodium 136 135 - 145 mmol/L    Potassium 3.7 3.6 - 5.5 mmol/L    Chloride 101 96 - 112 mmol/L    Co2 20 20 - 33 mmol/L    Anion Gap 15.0 7.0 - 16.0    Glucose 114 (H) 65 - 99 mg/dL    Bun 34 (H) 8 - 22 mg/dL    Creatinine 1.68 (H) 0.50 - 1.40 mg/dL    Calcium 8.7 8.5 - 10.5 mg/dL    AST(SGOT) 13 12 - 45 U/L    ALT(SGPT) 13 2 - 50 U/L    Alkaline Phosphatase 45 30 - 99 U/L    Total Bilirubin 0.3 0.1 - 1.5 mg/dL    Albumin 3.8 3.2 - 4.9 g/dL    Total Protein 6.4 6.0 - 8.2 g/dL    Globulin 2.6 1.9 - 3.5 g/dL    A-G Ratio 1.5 g/dL   LIPASE   Result Value Ref Range    Lipase 15 11 - 82 U/L   PROTHROMBIN TIME   Result Value Ref Range    PT 13.6 12.0 - 14.6 sec    INR 1.01 0.87 - 1.13   APTT   Result Value Ref Range    APTT 25.1 24.7 - 36.0 sec   ESTIMATED GFR   Result Value Ref Range    GFR If  47 (A) >60 mL/min/1.73 m 2    GFR If Non  38 (A) >60 mL/min/1.73 m 2   EKG   Result Value Ref Range    Report       [facility]    Test Date:  2020  Pt Name:    ESTHER MAGAÑA                Department: ER  MRN:        [id number]                      Room:       [facility]  Gender:     Male                         Technician: [id number]  :        [id number]                   Requested  By:QUANG GARCIA  Order #:    [id number]                    Reading MD:    Measurements  Intervals                                Axis  Rate:       76                           P:          42  MS:         212                          QRS:        269  QRSD:       194                          T:          85  QT:         492  QTc:        554    Interpretive Statements  ATRIAL-VENTRICULAR DUAL-PACED COMPLEXES  NO FURTHER ANALYSIS ATTEMPTED DUE TO PACED RHYTHM  BASELINE WANDER IN LEAD(S) I,III,aVR,aVL,aVF,V1,V2,V3,V4,V5,V6  Compared to ECG 12/31/2012 11:06:23  Sinus rhythm no longer present  First degree AV block no longer present  Intraventricular conduction delay no longer present           COURSE & MEDICAL DECISION MAKING  Pertinent Labs & Imaging studies reviewed. (See chart for details)  This is a pleasant 89-year-old gentleman presents as a possible [facility].  The patient was brought to emergency department after receiving 3 doses of nitroglycerin in route became extremely hypotensive.  EKG was completed revealed no evidence of significant abnormality except for paced rhythm.  Dr. Sharma back with cardiology did see the patient and evaluate the patient with a consult and does not believe the patient requires emergent angiogram or intervention.  Patient has been medically managed.  He does have a history of a TAVR in the past and there is concerned secondary his hypotension and previous surgery that he may have an aortic abnormality.  For this reason CT thoracoabdominal evaluation was completed was negative for acute abnormality.  The patient did respond to 1 L normal saline IV fluid now has a normal blood pressure and is pain-free and is no longer nauseous received Zofran 4 mg.  It is very possible the patient is experiencing myocardial infarction but at this point time there is no significant clinical evidence for this.  The patient does not have evidence of an infectious etiology of his condition, do not  suspect pulmonary embolism, aortic dissection, aortic aneurysm, ST elevation myocardial infarction, pancreatitis, cholecystitis or significant abdominal abnormality.  I discussed the patient with Dr. Duff who graciously accepts the patient for hospitalization.      These note the patient's son Stanislav Coleman is current in [facility] like to know his prognosis and disposition.  [phone]    Daughter Enid [phone] would also like a call if possible.    FINAL IMPRESSION  Near syncope  Nausea  Hypotension secondary to nitroglycerin    Electronically signed by: Dionicio Gibson D.O., 9/7/2020 3:58 PM

## 2020-09-08 LAB
ALBUMIN SERPL BCP-MCNC: 3.8 G/DL (ref 3.2–4.9)
ALBUMIN/GLOB SERPL: 1.5 G/DL
ALP SERPL-CCNC: 47 U/L (ref 30–99)
ALT SERPL-CCNC: 16 U/L (ref 2–50)
ANION GAP SERPL CALC-SCNC: 12 MMOL/L (ref 7–16)
AST SERPL-CCNC: 9 U/L (ref 12–45)
BASOPHILS # BLD AUTO: 0.6 % (ref 0–1.8)
BASOPHILS # BLD: 0.05 K/UL (ref 0–0.12)
BILIRUB SERPL-MCNC: 0.4 MG/DL (ref 0.1–1.5)
BUN SERPL-MCNC: 30 MG/DL (ref 8–22)
CALCIUM SERPL-MCNC: 8.5 MG/DL (ref 8.5–10.5)
CHLORIDE SERPL-SCNC: 104 MMOL/L (ref 96–112)
CO2 SERPL-SCNC: 23 MMOL/L (ref 20–33)
CREAT SERPL-MCNC: 1.3 MG/DL (ref 0.5–1.4)
EOSINOPHIL # BLD AUTO: 0.29 K/UL (ref 0–0.51)
EOSINOPHIL NFR BLD: 3.2 % (ref 0–6.9)
ERYTHROCYTE [DISTWIDTH] IN BLOOD BY AUTOMATED COUNT: 52.2 FL (ref 35.9–50)
GLOBULIN SER CALC-MCNC: 2.6 G/DL (ref 1.9–3.5)
GLUCOSE SERPL-MCNC: 146 MG/DL (ref 65–99)
HCT VFR BLD AUTO: 40.6 % (ref 42–52)
HGB BLD-MCNC: 13 G/DL (ref 14–18)
IMM GRANULOCYTES # BLD AUTO: 0.04 K/UL (ref 0–0.11)
IMM GRANULOCYTES NFR BLD AUTO: 0.4 % (ref 0–0.9)
LYMPHOCYTES # BLD AUTO: 0.89 K/UL (ref 1–4.8)
LYMPHOCYTES NFR BLD: 9.8 % (ref 22–41)
MAGNESIUM SERPL-MCNC: 1.9 MG/DL (ref 1.5–2.5)
MCH RBC QN AUTO: 29.8 PG (ref 27–33)
MCHC RBC AUTO-ENTMCNC: 32 G/DL (ref 33.7–35.3)
MCV RBC AUTO: 93.1 FL (ref 81.4–97.8)
MONOCYTES # BLD AUTO: 0.6 K/UL (ref 0–0.85)
MONOCYTES NFR BLD AUTO: 6.6 % (ref 0–13.4)
NEUTROPHILS # BLD AUTO: 7.19 K/UL (ref 1.82–7.42)
NEUTROPHILS NFR BLD: 79.4 % (ref 44–72)
NRBC # BLD AUTO: 0 K/UL
NRBC BLD-RTO: 0 /100 WBC
PLATELET # BLD AUTO: 190 K/UL (ref 164–446)
PMV BLD AUTO: 11.8 FL (ref 9–12.9)
POTASSIUM SERPL-SCNC: 4.1 MMOL/L (ref 3.6–5.5)
PROT SERPL-MCNC: 6.4 G/DL (ref 6–8.2)
RBC # BLD AUTO: 4.36 M/UL (ref 4.7–6.1)
SODIUM SERPL-SCNC: 139 MMOL/L (ref 135–145)
WBC # BLD AUTO: 9.1 K/UL (ref 4.8–10.8)

## 2020-09-08 PROCEDURE — 85025 COMPLETE CBC W/AUTO DIFF WBC: CPT

## 2020-09-08 PROCEDURE — 700102 HCHG RX REV CODE 250 W/ 637 OVERRIDE(OP): Performed by: INTERNAL MEDICINE

## 2020-09-08 PROCEDURE — 99232 SBSQ HOSP IP/OBS MODERATE 35: CPT | Performed by: HOSPITALIST

## 2020-09-08 PROCEDURE — 80053 COMPREHEN METABOLIC PANEL: CPT

## 2020-09-08 PROCEDURE — 36415 COLL VENOUS BLD VENIPUNCTURE: CPT

## 2020-09-08 PROCEDURE — A9270 NON-COVERED ITEM OR SERVICE: HCPCS | Performed by: INTERNAL MEDICINE

## 2020-09-08 PROCEDURE — 83735 ASSAY OF MAGNESIUM: CPT

## 2020-09-08 PROCEDURE — 770020 HCHG ROOM/CARE - TELE (206)

## 2020-09-08 PROCEDURE — 700111 HCHG RX REV CODE 636 W/ 250 OVERRIDE (IP): Performed by: INTERNAL MEDICINE

## 2020-09-08 RX ORDER — MECLIZINE HYDROCHLORIDE 25 MG/1
25 TABLET ORAL 3 TIMES DAILY PRN
Status: DISCONTINUED | OUTPATIENT
Start: 2020-09-08 | End: 2020-09-09 | Stop reason: HOSPADM

## 2020-09-08 RX ADMIN — DOCUSATE SODIUM 50 MG AND SENNOSIDES 8.6 MG 2 TABLET: 8.6; 5 TABLET, FILM COATED ORAL at 05:23

## 2020-09-08 RX ADMIN — ENOXAPARIN SODIUM 40 MG: 40 INJECTION SUBCUTANEOUS at 05:23

## 2020-09-08 RX ADMIN — OMEPRAZOLE 20 MG: 20 CAPSULE, DELAYED RELEASE ORAL at 08:17

## 2020-09-08 RX ADMIN — TAMSULOSIN HYDROCHLORIDE 0.4 MG: 0.4 CAPSULE ORAL at 08:17

## 2020-09-08 RX ADMIN — ESCITALOPRAM OXALATE 20 MG: 10 TABLET ORAL at 05:23

## 2020-09-08 ASSESSMENT — LIFESTYLE VARIABLES
HAVE PEOPLE ANNOYED YOU BY CRITICIZING YOUR DRINKING: NO
ON A TYPICAL DAY WHEN YOU DRINK ALCOHOL HOW MANY DRINKS DO YOU HAVE: 0
EVER HAD A DRINK FIRST THING IN THE MORNING TO STEADY YOUR NERVES TO GET RID OF A HANGOVER: NO
TOTAL SCORE: 0
HOW MANY TIMES IN THE PAST YEAR HAVE YOU HAD 5 OR MORE DRINKS IN A DAY: 0
TOTAL SCORE: 0
TOTAL SCORE: 0
EVER FELT BAD OR GUILTY ABOUT YOUR DRINKING: NO
CONSUMPTION TOTAL: NEGATIVE
ALCOHOL_USE: YES
AVERAGE NUMBER OF DAYS PER WEEK YOU HAVE A DRINK CONTAINING ALCOHOL: 2
HAVE YOU EVER FELT YOU SHOULD CUT DOWN ON YOUR DRINKING: NO

## 2020-09-08 NOTE — ED NOTES
Report received from SCARLETT Copeland. Neuroscience called and notified pt is ready for transfer. Pt resting comfortably in bed, equal and unlabored breathing. VSS.

## 2020-09-08 NOTE — ASSESSMENT & PLAN NOTE
He developed severe dizziness worse with movement   Trial meclizine   Admit to telemetry.  Pacemaker interrogation.  PT/OT evaluation and recommendations.

## 2020-09-08 NOTE — ASSESSMENT & PLAN NOTE
Pacer has been interrogated   He has had events of a flutter/fibb most recent on 9/3/20 but after dicussion with patient and daughter given age and risk factors will not be started on anticoagulation

## 2020-09-08 NOTE — CARE PLAN
Problem: Communication  Goal: The ability to communicate needs accurately and effectively will improve  Outcome: PROGRESSING SLOWER THAN EXPECTED  Note: Patient is confused, POC discussed with patient. Pending PT/OT eval.      Problem: Safety  Goal: Will remain free from injury  Outcome: PROGRESSING AS EXPECTED  Note: Up to bathroom with one person assist. Safety precautions in place

## 2020-09-08 NOTE — ED NOTES
"Pacemaker interrogation completed. Pt was not sure of the type of pacemaker, pt did not have card with him. Pt's son called and found that pt had [place] pacemaker. Staff from [facility] called once interrogation was completed and reports the interrogation was WNL. Staff did report that pt has not been seen in over 2 yrs and \"adjustments need to be made.\" Staff stated she would be coming to [facility] while pt is admitted, to make adjustments.  "

## 2020-09-08 NOTE — PROGRESS NOTES
[facility] Medicine Daily Progress Note    Date of Service  9/8/2020    Chief Complaint  89 y.o. male admitted 9/7/2020 with dizziness.     Hospital Course    89 y.o. male with past medical history of chronic artery disease, dementia and pacemaker who presented to the hospital on 9/7/2020 with complaint of severe dizziness that started today.  He reported that he noticed severe dizziness and his wife called EMS and he came to the hospital.  At the time of evaluation he reported he feels normal and denies any symptoms of pain, nausea, vomiting, diarrhea or any other acute complaints.  He knows that he is in the hospital and he is moving all his extremities.     HPI is limited as patient has dementia.      Interval Problem Update  Continued dizziness worse with standing and movement   BP improved on IVF   No chest pain   No acute events overnight  VS sstable    Consultants/Specialty  none    Code Status  DNAR/DNI    Disposition  TBD    Review of Systems  Review of Systems   Unable to perform ROS: Dementia        Physical Exam  Temp:  [35.6 °C (96 °F)-36.4 °C (97.6 °F)] 36.1 °C (97 °F)  Pulse:  [74-82] 76  Resp:  [11-18] 16  BP: ()/(52-77) 120/73  SpO2:  [92 %-99 %] 92 %    Physical Exam  Vitals signs reviewed.   Constitutional:       General: He is not in acute distress.     Appearance: He is not ill-appearing.   HENT:      Head: Normocephalic and atraumatic.      Nose: No congestion.      Mouth/Throat:      Mouth: Mucous membranes are dry.   Eyes:      Extraocular Movements: Extraocular movements intact.      Pupils: Pupils are equal, round, and reactive to light.   Neck:      Musculoskeletal: Neck supple.   Cardiovascular:      Rate and Rhythm: Normal rate and regular rhythm.      Pulses: Normal pulses.      Heart sounds: Normal heart sounds.   Pulmonary:      Effort: Pulmonary effort is normal. No respiratory distress.      Breath sounds: Normal breath sounds. No wheezing.   Abdominal:      General: Bowel  sounds are normal. There is no distension.      Palpations: Abdomen is soft.      Tenderness: There is no abdominal tenderness.   Musculoskeletal:         General: No swelling.   Skin:     General: Skin is warm and dry.      Capillary Refill: Capillary refill takes 2 to 3 seconds.   Neurological:      General: No focal deficit present.      Mental Status: He is alert. Mental status is at baseline. He is disoriented.      Comments: dementia    Psychiatric:         Mood and Affect: Mood normal.         Behavior: Behavior normal.         Thought Content: Thought content normal.         Judgment: Judgment normal.         Fluids    Intake/Output Summary (Last 24 hours) at 9/8/2020 1315  Last data filed at 9/8/2020 1200  Gross per 24 hour   Intake 1720 ml   Output 1050 ml   Net 670 ml       Laboratory  Recent Labs     09/07/20  1441 09/08/20  0924   WBC 8.4 9.1   RBC 4.35* 4.36*   HEMOGLOBIN 12.9* 13.0*   HEMATOCRIT 40.1* 40.6*   MCV 92.2 93.1   MCH 29.7 29.8   MCHC 32.2* 32.0*   RDW 51.6* 52.2*   PLATELETCT 193 190   MPV 12.1 11.8     Recent Labs     09/07/20  1441 09/08/20  0924   SODIUM 136 139   POTASSIUM 3.7 4.1   CHLORIDE 101 104   CO2 20 23   GLUCOSE 114* 146*   BUN 34* 30*   CREATININE 1.68* 1.30   CALCIUM 8.7 8.5     Recent Labs     09/07/20  1441   APTT 25.1   INR 1.01               Imaging  CT-CTA COMPLETE THORACOABDOMINAL AORTA   Final Result      1.  No thoracic or abdominal aortic aneurysm or dissection.   2.  Prosthetic aortic valve.   3.  No pneumonia or pneumothorax.   4.  Markedly atrophic LEFT kidney again seen.            DX-CHEST-PORTABLE (1 VIEW)   Final Result      Mild left basilar atelectasis.           Assessment/Plan  * Dizziness  Assessment & Plan  He developed severe dizziness worse with movement   Trial meclizine   Admit to telemetry.  Pacemaker interrogation.  PT/OT evaluation and recommendations.    Hypotension  Assessment & Plan  Improved with IVF   Suspect 2/2 to nitro given by EMS  Cont  to monitor   Stop IVF and reassess     Status post cardiac pacemaker procedure- (present on admission)  Assessment & Plan  Pacer has been interrogated   He has had events of a flutter/fibb most recent on 9/3/20 but after dicussion with patient and daughter given age and risk factors will not be started on anticoagulation     Depression- (present on admission)  Assessment & Plan  Continue outpatient medications.       VTE prophylaxis: lovenox

## 2020-09-08 NOTE — PROGRESS NOTES
CC: nausea vomiting, SBP around 90 initially  Has history of pacemkaer, CABG  Paramedic gave 2 nitro: and brought BP down  Dr. Pollard consulted initially for pre hospital STEMI: but the patient had pacemaker, recommended medical management  BP responded well to IVF  CT chest: negative.  DNAR/DNI  Patient likely has underlying dementia/memory issues.          Dr. Ronquillo to admit.

## 2020-09-08 NOTE — H&P
Hospital Medicine History & Physical Note    Date of Service  9/7/2020    Primary Care Physician  Phil Mcgee M.D.    Consultants  Cardiology    Code Status  DNAR/DNI    I reviewed POLST    Chief Complaint  Chief Complaint   Patient presents with   • Nausea   • Syncope       History of Presenting Illness    89 y.o. male with past medical history of chronic artery disease, dementia and pacemaker who presented to the hospital on 9/7/2020 with complaint of severe dizziness that started today.  He reported that he noticed severe dizziness and his wife called EMS and he came to the hospital.  At the time of evaluation he reported he feels normal and denies any symptoms of pain, nausea, vomiting, diarrhea or any other acute complaints.  He knows that he is in the hospital and he is moving all his extremities.    HPI is limited as patient has dementia.    ER course: In route to the hospital EMS started patient has ST elevation MI and he received nitro and systolic blood pressure in 90 and he became hypotensive and he received IV fluids and his blood pressure improved.  Due to concern for STEMI and cardiology consulted and they evaluated him and recommended that he does not have signs of coronary artery disease and also his POLST indicates DNR and comfort goal oriented management.  At the time of evaluation patient is having pacemaker interrogation.      Review of Systems  Review of Systems   Constitutional: Negative for chills, fever and weight loss.   HENT: Negative for hearing loss and tinnitus.    Eyes: Negative for blurred vision, double vision, photophobia and pain.   Respiratory: Negative for cough, sputum production and shortness of breath.    Cardiovascular: Negative for chest pain, palpitations, orthopnea and leg swelling.   Gastrointestinal: Positive for nausea. Negative for abdominal pain, constipation, diarrhea and vomiting.   Genitourinary: Negative for dysuria, frequency and urgency.   Musculoskeletal:  Negative for back pain, joint pain, myalgias and neck pain.   Skin: Negative for rash.   Neurological: Positive for dizziness. Negative for tingling, tremors, sensory change, speech change, focal weakness and headaches.   Psychiatric/Behavioral: Negative for hallucinations and substance abuse.   All other systems reviewed and are negative.      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    Surgical History   has a past surgical history that includes coronary artery bypass, 3; ventral hernia repair; hip arthroplasty total; shoulder arthroplasty total (2000); laparoscopy (1/8/2013); and mass excision general (1/8/2013).     Family History  Not pertinent    Social History   reports that he has never smoked. He has never used smokeless tobacco. He reports current alcohol use of about 7.0 oz of alcohol per week. He reports that he does not use drugs.    Allergies  Allergies   Allergen Reactions   • Pcn [Penicillins] Itching   • Sulfa Drugs Itching       Medications  Prior to Admission Medications   Prescriptions Last Dose Informant Patient Reported? Taking?   escitalopram (LEXAPRO) 20 MG tablet unknown at unknown Patient No No   Sig: Take 1 Tab by mouth every day.   esomeprazole (NEXIUM) 20 MG capsule unknown at unknown Patient No No   Sig: TAKE 1 CAP BY MOUTH EVERY MORNING BEFORE BREAKFAST.   hydroCHLOROthiazide (HYDRODIURIL) 25 MG Tab unknown at unknown Patient No No   Sig: Take 1 Tab by mouth every day.   tamsulosin (FLOMAX) 0.4 MG capsule unknown at unknown Patient No No   Sig: TAKE 1 CAP BY MOUTH ONE-HALF HOUR AFTER BREAKFAST.      Facility-Administered Medications: None        Physical Exam  Temp:  [35.6 °C (96 °F)] 35.6 °C (96 °F)  Pulse:  [75-81] 78  Resp:  [11-18] 18  BP: ()/(52-61) 96/57  SpO2:  [94 %-98 %] 98 %    Physical Exam  Vitals signs reviewed.   Constitutional:       General: He is not in acute distress.     Appearance: He is obese.   HENT:      Head: Normocephalic and atraumatic. No contusion.      Right Ear: External ear normal.      Left Ear: External ear normal.      Nose: Nose normal.      Mouth/Throat:      Mouth: Mucous membranes are dry.      Pharynx: No oropharyngeal exudate.   Eyes:      General:         Right eye: No discharge.         Left eye: No discharge.      Pupils: Pupils are equal, round, and reactive to light.   Neck:      Musculoskeletal: No neck rigidity or muscular tenderness.   Cardiovascular:      Rate and Rhythm: Normal rate and regular rhythm.      Heart sounds: No murmur. No friction rub. No gallop.    Pulmonary:      Effort: Pulmonary effort is normal.      Breath sounds: No wheezing or rhonchi.   Abdominal:      General: Bowel sounds are normal. There is no distension.      Palpations: Abdomen is soft.      Tenderness: There is no abdominal tenderness. There is no rebound.   Musculoskeletal: Normal range of motion.         General: No swelling or tenderness.   Skin:     General: Skin is warm and dry.      Coloration: Skin is not jaundiced.   Neurological:      General: No focal deficit present.      Mental Status: He is alert.      Cranial Nerves: No cranial nerve deficit.      Sensory: No sensory deficit.      Comments: Moving all his extremities.  No focal neurological deficit  He knows Lynda is the president of [place] and he is in the hospital he is not aware of year.   Psychiatric:         Mood and Affect: Mood normal.         Laboratory:  Recent Labs     09/07/20  1441   WBC 8.4   RBC 4.35*   HEMOGLOBIN 12.9*   HEMATOCRIT 40.1*   MCV 92.2   MCH 29.7   MCHC 32.2*   RDW 51.6*   PLATELETCT 193   MPV 12.1     Recent Labs      09/07/20  1441   SODIUM 136   POTASSIUM 3.7   CHLORIDE 101   CO2 20   GLUCOSE 114*   BUN 34*   CREATININE 1.68*   CALCIUM 8.7     Recent Labs     09/07/20  1441   ALTSGPT 13   ASTSGOT 13   ALKPHOSPHAT 45   TBILIRUBIN 0.3   LIPASE 15   GLUCOSE 114*     Recent Labs     09/07/20  1441   APTT 25.1   INR 1.01     Recent Labs     09/07/20  1441   NTPROBNP 258*         Recent Labs     09/07/20  1441   TROPONINT 25*       Imaging:  CT-CTA COMPLETE THORACOABDOMINAL AORTA   Final Result      1.  No thoracic or abdominal aortic aneurysm or dissection.   2.  Prosthetic aortic valve.   3.  No pneumonia or pneumothorax.   4.  Markedly atrophic LEFT kidney again seen.            DX-CHEST-PORTABLE (1 VIEW)   Final Result      Mild left basilar atelectasis.            Assessment/Plan:  I anticipate this patient will require at least two midnights for appropriate medical management, necessitating inpatient admission.    Dizziness  Assessment & Plan  He developed severe dizziness and at the time of evaluation her symptoms of dizziness resolved.  Admit to telemetry.  Pacemaker interrogation.  PT/OT evaluation and recommendations.    Hypotension  Assessment & Plan  He developed hypotension prior to arrival to the hospital.  Started him on IV fluid.  Admit to telemetry for close monitoring.    Status post cardiac pacemaker procedure- (present on admission)  Assessment & Plan  He has been getting pacemaker interrogation while in ER.  Admit to telemetry.    Depression- (present on admission)  Assessment & Plan  Continue outpatient medications.    I reviewed Dr. Mcgee note from Nai 3, 2020 which indicates that patient has history of dementia and depression.

## 2020-09-08 NOTE — PROGRESS NOTES
With patient’s permission (if able), completed daily phone call to designated support person, name Enid and Brad (daughter and son)  Discussed patient condition and plan of care. All questions answered.  Follow up requested: Dr. Watson to call family.

## 2020-09-09 ENCOUNTER — TELEPHONE (OUTPATIENT)
Dept: INTERNAL MEDICINE | Facility: IMAGING CENTER | Age: 85
End: 2020-09-09

## 2020-09-09 VITALS
BODY MASS INDEX: 31.67 KG/M2 | DIASTOLIC BLOOD PRESSURE: 80 MMHG | SYSTOLIC BLOOD PRESSURE: 156 MMHG | HEART RATE: 80 BPM | HEIGHT: 68 IN | OXYGEN SATURATION: 93 % | TEMPERATURE: 97.1 F | WEIGHT: 209 LBS | RESPIRATION RATE: 17 BRPM

## 2020-09-09 PROBLEM — I95.9 HYPOTENSION: Status: RESOLVED | Noted: 2020-09-07 | Resolved: 2020-09-09

## 2020-09-09 LAB
ALBUMIN SERPL BCP-MCNC: 3.6 G/DL (ref 3.2–4.9)
ALBUMIN/GLOB SERPL: 1.4 G/DL
ALP SERPL-CCNC: 43 U/L (ref 30–99)
ALT SERPL-CCNC: 16 U/L (ref 2–50)
ANION GAP SERPL CALC-SCNC: 10 MMOL/L (ref 7–16)
AST SERPL-CCNC: 11 U/L (ref 12–45)
BILIRUB SERPL-MCNC: 0.3 MG/DL (ref 0.1–1.5)
BUN SERPL-MCNC: 22 MG/DL (ref 8–22)
CALCIUM SERPL-MCNC: 8.7 MG/DL (ref 8.5–10.5)
CHLORIDE SERPL-SCNC: 104 MMOL/L (ref 96–112)
CO2 SERPL-SCNC: 26 MMOL/L (ref 20–33)
CREAT SERPL-MCNC: 1.41 MG/DL (ref 0.5–1.4)
GLOBULIN SER CALC-MCNC: 2.6 G/DL (ref 1.9–3.5)
GLUCOSE SERPL-MCNC: 155 MG/DL (ref 65–99)
POTASSIUM SERPL-SCNC: 3.9 MMOL/L (ref 3.6–5.5)
PROT SERPL-MCNC: 6.2 G/DL (ref 6–8.2)
SODIUM SERPL-SCNC: 140 MMOL/L (ref 135–145)

## 2020-09-09 PROCEDURE — 700102 HCHG RX REV CODE 250 W/ 637 OVERRIDE(OP): Performed by: INTERNAL MEDICINE

## 2020-09-09 PROCEDURE — 99239 HOSP IP/OBS DSCHRG MGMT >30: CPT | Performed by: HOSPITALIST

## 2020-09-09 PROCEDURE — 97165 OT EVAL LOW COMPLEX 30 MIN: CPT

## 2020-09-09 PROCEDURE — 36415 COLL VENOUS BLD VENIPUNCTURE: CPT

## 2020-09-09 PROCEDURE — A9270 NON-COVERED ITEM OR SERVICE: HCPCS | Performed by: INTERNAL MEDICINE

## 2020-09-09 PROCEDURE — 97161 PT EVAL LOW COMPLEX 20 MIN: CPT

## 2020-09-09 PROCEDURE — 80053 COMPREHEN METABOLIC PANEL: CPT

## 2020-09-09 PROCEDURE — 700111 HCHG RX REV CODE 636 W/ 250 OVERRIDE (IP): Performed by: INTERNAL MEDICINE

## 2020-09-09 RX ORDER — MECLIZINE HYDROCHLORIDE 25 MG/1
25 TABLET ORAL 3 TIMES DAILY PRN
Qty: 30 TAB | Refills: 0 | Status: SHIPPED | OUTPATIENT
Start: 2020-09-09 | End: 2020-10-05 | Stop reason: SDUPTHER

## 2020-09-09 RX ADMIN — OMEPRAZOLE 20 MG: 20 CAPSULE, DELAYED RELEASE ORAL at 08:33

## 2020-09-09 RX ADMIN — TAMSULOSIN HYDROCHLORIDE 0.4 MG: 0.4 CAPSULE ORAL at 08:33

## 2020-09-09 RX ADMIN — ESCITALOPRAM OXALATE 20 MG: 10 TABLET ORAL at 05:09

## 2020-09-09 RX ADMIN — DOCUSATE SODIUM 50 MG AND SENNOSIDES 8.6 MG 2 TABLET: 8.6; 5 TABLET, FILM COATED ORAL at 05:10

## 2020-09-09 ASSESSMENT — COGNITIVE AND FUNCTIONAL STATUS - GENERAL
DAILY ACTIVITIY SCORE: 21
HELP NEEDED FOR BATHING: A LITTLE
TOILETING: A LITTLE
SUGGESTED CMS G CODE MODIFIER DAILY ACTIVITY: CJ
SUGGESTED CMS G CODE MODIFIER MOBILITY: CH
DRESSING REGULAR LOWER BODY CLOTHING: A LITTLE
MOBILITY SCORE: 24

## 2020-09-09 ASSESSMENT — GAIT ASSESSMENTS
DISTANCE (FEET): 200
DEVIATION: DECREASED TOE OFF;DECREASED HEEL STRIKE;OTHER (COMMENT)
GAIT LEVEL OF ASSIST: INDEPENDENT

## 2020-09-09 ASSESSMENT — ACTIVITIES OF DAILY LIVING (ADL): TOILETING: INDEPENDENT

## 2020-09-09 NOTE — DISCHARGE INSTRUCTIONS
Discharge Instructions    Discharged to home by car with caretaker. Discharged via wheelchair, hospital escort: Yes.  Special equipment needed: Not Applicable    Be sure to schedule a follow-up appointment with your primary care doctor or any specialists as instructed.     Discharge Plan:   Diet Plan: Discussed  Activity Level: Discussed  Confirmed Follow up Appointment: Patient to Call and Schedule Appointment  Confirmed Symptoms Management: Discussed  Medication Reconciliation Updated: Yes    I understand that a diet low in cholesterol, fat, and sodium is recommended for good health. Unless I have been given specific instructions below for another diet, I accept this instruction as my diet prescription.   Other diet: cardiac    Special Instructions: None    · Is patient discharged on Warfarin / Coumadin?   No     Depression / Suicide Risk    As you are discharged from this [facility] facility, it is important to learn how to keep safe from harming yourself.    Recognize the warning signs:  · Abrupt changes in personality, positive or negative- including increase in energy   · Giving away possessions  · Change in eating patterns- significant weight changes-  positive or negative  · Change in sleeping patterns- unable to sleep or sleeping all the time   · Unwillingness or inability to communicate  · Depression  · Unusual sadness, discouragement and loneliness  · Talk of wanting to die  · Neglect of personal appearance   · Rebelliousness- reckless behavior  · Withdrawal from people/activities they love  · Confusion- inability to concentrate     If you or a loved one observes any of these behaviors or has concerns about self-harm, here's what you can do:  · Talk about it- your feelings and reasons for harming yourself  · Remove any means that you might use to hurt yourself (examples: pills, rope, extension cords, firearm)  · Get professional help from the community (Mental Health, Substance Abuse, psychological  counseling)  · Do not be alone:Call your Safe Contact- someone whom you trust who will be there for you.  · Call your local CRISIS HOTLINE 539-8877 or 675-533-0657  · Call your local Children's Mobile Crisis Response Team [place] (615) 173-8891 or www.TicketForEvent  · Call the toll free National Suicide Prevention Hotlines   · National Suicide Prevention Lifeline 531-381-LQBN (6803)  · National Hope Line Network 800-SUICIDE (777-0976)

## 2020-09-09 NOTE — CARE PLAN
Problem: Safety  Goal: Will remain free from injury  Outcome: PROGRESSING AS EXPECTED  Goal: Will remain free from falls  Outcome: PROGRESSING AS EXPECTED     Problem: Venous Thromboembolism (VTW)/Deep Vein Thrombosis (DVT) Prevention:  Goal: Patient will participate in Venous Thrombosis (VTE)/Deep Vein Thrombosis (DVT)Prevention Measures  Outcome: PROGRESSING AS EXPECTED     Problem: Infection  Goal: Will remain free from infection  Outcome: PROGRESSING SLOWER THAN EXPECTED

## 2020-09-09 NOTE — PROGRESS NOTES
PIV removed with tip intact. Patient dressed in personal shirt and hospital pants with only belongings being shorts in patient belonging bag. Discharge instructions provided and explained with caretaker, Josephine. I escorted pt out via WC to caretaker's car.

## 2020-09-09 NOTE — THERAPY
Occupational Therapy   Initial Evaluation     Patient Name: Anthony Coleman  Age:  89 y.o., Sex:  male  Medical Record #: [id number]  Today's Date: 9/9/2020     Precautions  Precautions: Fall Risk    Assessment  Patient is 89 y.o. male with a diagnosis of dizziness. Appears to be back to baseline.    Plan    Recommend Occupational Therapy for Evaluation only     DC Equipment Recommendations: None  Discharge Recommendations: Anticipate that the patient will have no further occupational therapy needs after discharge from the hospital        09/09/20 0664   Prior Living Situation   Prior Services Intermittent Physical Support for ADL Per Service;Intermittent Physical Support for ADL Per Family   Housing / Facility 1 Story House   Bathroom Set up Walk In Shower;Grab Bars   Equipment Owned Single Point Cane;Grab Bar(s) In Tub / Shower   Lives with - Patient's Self Care Capacity Spouse   Comments Wife manages his medications. Caregivers assist with all other IADLs.   Prior Level of ADL Function   Self Feeding Independent   Grooming / Hygiene Independent   Bathing Independent   Dressing Independent   Toileting Independent   Prior Level of IADL Function   Medication Management Requires Assist   Laundry Requires Assist   Kitchen Mobility Requires Assist   Finances Requires Assist   Home Management Requires Assist   Shopping Requires Assist   Prior Level Of Mobility Independent With Device in Community;Independent With Device in Home   Driving / Transportation Relatives / Others Provide Transportation   Occupation (Pre-Hospital Vocational) Retired Due To Age   Cognition    Cognition / Consciousness WDL   Level of Consciousness Alert   Comments hx dementia per chart, pleasant and cooperative   Bed Mobility    Supine to Sit Supervised   Sit to Supine Supervised   Scooting Supervised   Rolling Supervised   ADL Assessment   Grooming Supervision;Standing   Bathing Supervision  (seated/standing shower)   Upper Body Dressing  Supervision   Lower Body Dressing Supervision   Functional Mobility   Sit to Stand Supervised   Bed, Chair, Wheelchair Transfer Supervised   Tub / Shower Transfers Supervised   Mobility BR>BTB   Comments no AD

## 2020-09-09 NOTE — TELEPHONE ENCOUNTER
[facility]  Anthony is to follow up with [facility] at [phone] for vestibular training because of dizziness.

## 2020-09-09 NOTE — THERAPY
Physical Therapy   Initial Evaluation     Patient Name: Anthony Coleman  Age:  89 y.o., Sex:  male  Medical Record #: [id number]  Today's Date: 9/9/2020     Precautions: Fall Risk    Assessment  Patient is 89 y.o. male admitted due to severe dizziness.  Additional factors influencing patient status / progress h/o dementia.PT presents with mild dizziness with head turns nods and eyes closed. Pt would benefit from further therapy on an outpatient basis for vestibular rehab     Plan    Recommend Physical Therapy for Evaluation only     DC Equipment Recommendations: None  Discharge Recommendations: Recommend outpatient physical therapy services with focus on vestibular rehab and to address higher level deficits          Objective       09/09/20 0955   Prior Living Situation   Prior Services None   Housing / Facility 1 Story House   Steps Into Home 1   Steps In Home 0   Equipment Owned Single Point Cane   Lives with - Patient's Self Care Capacity Spouse   Prior Level of Functional Mobility   Bed Mobility Independent   Transfer Status Independent   Ambulation Independent   Stairs Independent   History of Falls   History of Falls Yes   Date of Last Fall 09/07/20  (due to dizziness.)   Cognition    Level of Consciousness Alert   Passive ROM Lower Body   Passive ROM Lower Body WDL   Active ROM Lower Body    Active ROM Lower Body  WDL   Strength Lower Body   Lower Body Strength  WDL   Sensation Lower Body   Lower Extremity Sensation   WDL   Lower Body Muscle Tone   Lower Body Muscle Tone  WDL   Strength Upper Body   Upper Body Strength  WDL   Coordination Upper Body   Coordination WDL   Coordination Lower Body    Coordination Lower Body  WDL   Vision   Occulomotor   (no nystagmus noted during assessment)   Vision Comments No dizziness with head turns, nodding, 4/10 dizziness with eyes closed.    Balance Assessment   Sitting Balance (Static) Good   Sitting Balance (Dynamic) Good   Standing Balance (Static) Good   Standing  Balance (Dynamic) Fair +   Weight Shift Sitting Good   Weight Shift Standing Good   Gait Analysis   Gait Level Of Assist Independent   Assistive Device None   Distance (Feet) 200   # of Times Distance was Traveled 1   Deviation Decreased Toe Off;Decreased Heel Strike;Other (Comment)  (guarded )   # of Stairs Climbed 2   Level of Assist with Stairs Independent   Weight Bearing Status No restrictions   Vision Deficits Impacting Mobility c/o dizziness after ambulating for 1 min , mild, no LOB   Bed Mobility    Supine to Sit Independent   Sit to Supine Independent   Scooting Independent   Rolling Independent   Functional Mobility   Sit to Stand Independent   Bed, Chair, Wheelchair Transfer Independent   Transfer Method Stand Step

## 2020-09-09 NOTE — DISCHARGE SUMMARY
Discharge Summary    CHIEF COMPLAINT ON ADMISSION  Chief Complaint   Patient presents with   • Nausea   • Syncope       Reason for Admission  EMS STEMI     Admission Date  9/7/2020    CODE STATUS  DNAR/DNI    HPI & HOSPITAL COURSE  This is a 89 y.o. male here with pmx of chronic artery disease, dementia and pacemaker who presented to the hospital with complaints of severe dizziness.  This patient presented to the hospital on 9/7/2028 developed acute dizziness and EMS was called by his wife.  At the time of evaluation by EMS he is told EMS that he thought he was having a heart attack.  Subsequently EMS gave him a dose of nitroglycerin and he was found to be hypotensive after dose of nitroglycerin and brought to the emergency department.  There is concerns of STEMI by EMS however upon arrival to the emergency department cardiology evaluated the patient and deemed this was not a STEMI.  His dizziness spontaneously resolved while he was in the hospital and his hypotension improved with IV fluids.  He did have his pacemaker interrogated and has had intermittent episodes of atrial fibrillation and atrial flutter last episode was on 9/3/2020.  After discussion with the patient and his family they recommended against anticoagulation given the patient's age and risk of bleeding.  He was seen by physical therapy and Occupational Therapy and was noted to have some mild dizziness with ambulation and will be sent for outpatient vestibular rehab.  Otherwise will be discharged home and has 24-hour caregiver at home.    Therefore, he is discharged in good and stable condition to home with close outpatient follow-up.    The patient met 2-midnight criteria for an inpatient stay at the time of discharge.    Discharge Date  9/9/20    FOLLOW UP ITEMS POST DISCHARGE  pcp x 1 week   Physical therapy     DISCHARGE DIAGNOSES  Principal Problem:    Dizziness POA: Unknown  Active Problems:    Depression POA: Yes    Status post cardiac  pacemaker procedure POA: Yes  Resolved Problems:    Hypotension POA: Unknown      FOLLOW UP  No future appointments.  Phil Mcgee M.D.  [address] [facility] [facility] [address]  [place] [id number] [phone]    In 1 week        MEDICATIONS ON DISCHARGE     Medication List      START taking these medications      Instructions   meclizine 25 MG Tabs  Commonly known as: ANTIVERT   Take 1 Tab by mouth 3 times a day as needed.  Dose: 25 mg        CONTINUE taking these medications      Instructions   escitalopram 20 MG tablet  Commonly known as: LEXAPRO   Take 1 Tab by mouth every day.  Dose: 20 mg     esomeprazole 20 MG capsule  Commonly known as: NEXIUM   TAKE 1 CAP BY MOUTH EVERY MORNING BEFORE BREAKFAST.  Dose: 20 mg     tamsulosin 0.4 MG capsule  Commonly known as: FLOMAX   TAKE 1 CAP BY MOUTH ONE-HALF HOUR AFTER BREAKFAST.  Dose: 0.4 mg        STOP taking these medications    hydroCHLOROthiazide 25 MG Tabs  Commonly known as: HYDRODIURIL            Allergies  Allergies   Allergen Reactions   • Pcn [Penicillins] Itching   • Sulfa Drugs Itching       DIET  Orders Placed This Encounter   Procedures   • Diet Order Cardiac     Standing Status:   Standing     Number of Occurrences:   1     Order Specific Question:   Diet:     Answer:   Cardiac [6]       ACTIVITY  As tolerated.  Weight bearing as tolerated    CONSULTATIONS  none    PROCEDURES  none    LABORATORY  Lab Results   Component Value Date    SODIUM 139 09/08/2020    POTASSIUM 4.1 09/08/2020    CHLORIDE 104 09/08/2020    CO2 23 09/08/2020    GLUCOSE 146 (H) 09/08/2020    BUN 30 (H) 09/08/2020    CREATININE 1.30 09/08/2020        Lab Results   Component Value Date    WBC 9.1 09/08/2020    HEMOGLOBIN 13.0 (L) 09/08/2020    HEMATOCRIT 40.6 (L) 09/08/2020    PLATELETCT 190 09/08/2020        Total time of the discharge process exceeds 35 minutes.

## 2020-09-15 ENCOUNTER — OFFICE VISIT (OUTPATIENT)
Dept: INTERNAL MEDICINE | Facility: IMAGING CENTER | Age: 85
End: 2020-09-15
Payer: MEDICARE

## 2020-09-15 VITALS
DIASTOLIC BLOOD PRESSURE: 70 MMHG | WEIGHT: 205 LBS | BODY MASS INDEX: 31.17 KG/M2 | OXYGEN SATURATION: 92 % | TEMPERATURE: 97 F | SYSTOLIC BLOOD PRESSURE: 118 MMHG | RESPIRATION RATE: 16 BRPM | HEART RATE: 83 BPM

## 2020-09-15 DIAGNOSIS — H81.13 BENIGN PAROXYSMAL POSITIONAL VERTIGO DUE TO BILATERAL VESTIBULAR DISORDER: ICD-10-CM

## 2020-09-15 PROCEDURE — 99213 OFFICE O/P EST LOW 20 MIN: CPT | Performed by: INTERNAL MEDICINE

## 2020-09-15 ASSESSMENT — FIBROSIS 4 INDEX: FIB4 SCORE: 1.32

## 2020-09-15 NOTE — PROGRESS NOTES
Chief Complaint   Patient presents with   • Hospital Follow-up   • Dizziness       HISTORY OF THE PRESENT ILLNESS: Patient is a 89 y.o. male.     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 He was admitted to the hospital on September 7 for dizziness. He was found to be hypertensive. There was initially concerned that he had undergone ST elevation MI however this was ruled out. He improved with fluids. He has a history of valve replacement pacemaker. The pacemaker was interrogated during the visit found to have episodes of atrial fibrillation flutter. Anticoagulation is not recommended due to fungus. Patient has expense multiple falls.    We discussed his symptoms in the office he is been experiencing vertiginous symptoms. He thinks this resulted in his fall. Symptoms have persisted despite hydration.    Patient with symptoms and findings consistent with benign positional vertigo. Symptoms are primarily on the right. He had a positive Oracio-Hallpike. He is [place] establish with physical therapy and I encouraged follow-up there for Epley maneuvers.  We discussed the possibility of hypertensive episodes related to his tamsulosin as well as hydration issues. His caregiver admits that he is poor about fluid intake. He is concerned about discontinuing tamsulosin because of prostate issues.    Allergies: Pcn [penicillins] and Sulfa drugs    Current Outpatient Medications Ordered in Epic   Medication Sig Dispense Refill   • meclizine (ANTIVERT) 25 MG Tab Take 1 Tab by mouth 3 times a day as needed. 30 Tab 0   • tamsulosin (FLOMAX) 0.4 MG capsule TAKE 1 CAP BY MOUTH ONE-HALF HOUR AFTER BREAKFAST. 90 Cap 2   • escitalopram (LEXAPRO) 20 MG tablet Take 1 Tab by mouth every day. 30 Tab 0   • esomeprazole (NEXIUM) 20 MG capsule TAKE 1 CAP BY MOUTH EVERY MORNING BEFORE BREAKFAST. 90 Cap 3     No current Epic-ordered facility-administered medications on file.        Past medical history, social history and family history  were reviewed from chart today    Review of systems: Per HPI.    Denies headache, chest pain, fever, chills, diarrhea, constipation, abdominal pain, palpitations, depression   All others negative.     Exam: /70 (BP Location: Left arm, Patient Position: Sitting, BP Cuff Size: Adult)   Pulse 83   Temp 36.1 °C (97 °F) (Temporal)   Resp 16   Wt 93 kg (205 lb)   SpO2 92%   General: Well-appearing. Well-developed. No signs of distress.  HEENT: Grossly normal. Oral cavity is pink and moist.  Neck: Supple without JVD or bruit.  Pulmonary: Clear with good breath sounds. Normal effort.  Cardiovascular: Regular. Carotid and radial pulses are intact.  Abdomen: Soft, nontender, nondistended. Spleen and liver are not enlarged.  Neurologic: Cranial nerves II through XII are grossly normal, alert and oriented x3      Diagnosis:  1. Benign paroxysmal positional vertigo due to bilateral vestibular disorder         Patient with dizziness and fall recently. His findings in the office seem most consistent with benign positional vertigo.  Recommend follow-up with physical therapy for vestibular therapy and Epley maneuver.  At this time will continue with his current medications including tamsulosin. If he should expense orthostatic symptoms then I will recommend discontinuing.

## 2020-10-05 ENCOUNTER — TELEPHONE (OUTPATIENT)
Dept: INTERNAL MEDICINE | Facility: IMAGING CENTER | Age: 85
End: 2020-10-05

## 2020-10-05 RX ORDER — MECLIZINE HYDROCHLORIDE 25 MG/1
25 TABLET ORAL 3 TIMES DAILY PRN
Qty: 30 TAB | Refills: 0 | Status: SHIPPED | OUTPATIENT
Start: 2020-10-05

## 2020-10-05 NOTE — TELEPHONE ENCOUNTER
Patient's wife calls reporting elevated blood pressure of 170/96 x 1 week.  He is weak, dizzy and loss of appetite.  Recommend evaluation by [facility] and possible further evaluation in ED.  They agree to see care and will call.

## 2020-10-05 NOTE — TELEPHONE ENCOUNTER
Informed prescription was sent to pharmacy and contact information for [facility] given to care giver to call and schedule appointment for evaluation.

## 2020-10-06 ENCOUNTER — APPOINTMENT (OUTPATIENT)
Dept: INTERNAL MEDICINE | Facility: IMAGING CENTER | Age: 85
End: 2020-10-06
Payer: MEDICARE

## 2020-10-09 ENCOUNTER — NON-PROVIDER VISIT (OUTPATIENT)
Dept: INTERNAL MEDICINE | Facility: IMAGING CENTER | Age: 85
End: 2020-10-09
Payer: MEDICARE

## 2020-10-09 DIAGNOSIS — Z23 NEED FOR INFLUENZA VACCINATION: ICD-10-CM

## 2020-10-09 PROCEDURE — 90662 IIV NO PRSV INCREASED AG IM: CPT | Performed by: INTERNAL MEDICINE

## 2020-10-09 PROCEDURE — G0008 ADMIN INFLUENZA VIRUS VAC: HCPCS | Performed by: INTERNAL MEDICINE

## 2020-10-27 ENCOUNTER — APPOINTMENT (OUTPATIENT)
Dept: PHYSICAL THERAPY | Facility: REHABILITATION | Age: 85
End: 2020-10-27
Payer: MEDICARE

## 2020-10-29 ENCOUNTER — APPOINTMENT (OUTPATIENT)
Dept: PHYSICAL THERAPY | Facility: REHABILITATION | Age: 85
End: 2020-10-29
Payer: MEDICARE

## 2020-11-03 ENCOUNTER — APPOINTMENT (OUTPATIENT)
Dept: PHYSICAL THERAPY | Facility: REHABILITATION | Age: 85
End: 2020-11-03
Payer: MEDICARE

## 2020-11-05 ENCOUNTER — APPOINTMENT (OUTPATIENT)
Dept: PHYSICAL THERAPY | Facility: REHABILITATION | Age: 85
End: 2020-11-05
Payer: MEDICARE

## 2020-11-10 ENCOUNTER — APPOINTMENT (OUTPATIENT)
Dept: PHYSICAL THERAPY | Facility: REHABILITATION | Age: 85
End: 2020-11-10
Payer: MEDICARE

## 2020-11-12 ENCOUNTER — APPOINTMENT (OUTPATIENT)
Dept: PHYSICAL THERAPY | Facility: REHABILITATION | Age: 85
End: 2020-11-12
Payer: MEDICARE

## 2020-11-14 ENCOUNTER — HOSPITAL ENCOUNTER (INPATIENT)
Facility: MEDICAL CENTER | Age: 85
LOS: 1 days | DRG: 281 | End: 2020-11-17
Attending: EMERGENCY MEDICINE | Admitting: STUDENT IN AN ORGANIZED HEALTH CARE EDUCATION/TRAINING PROGRAM
Payer: MEDICARE

## 2020-11-14 ENCOUNTER — APPOINTMENT (OUTPATIENT)
Dept: RADIOLOGY | Facility: MEDICAL CENTER | Age: 85
DRG: 281 | End: 2020-11-14
Attending: EMERGENCY MEDICINE
Payer: MEDICARE

## 2020-11-14 DIAGNOSIS — R79.89 ELEVATED TROPONIN: ICD-10-CM

## 2020-11-14 DIAGNOSIS — R07.9 CHEST PAIN, UNSPECIFIED TYPE: ICD-10-CM

## 2020-11-14 PROCEDURE — 84484 ASSAY OF TROPONIN QUANT: CPT

## 2020-11-14 PROCEDURE — 93005 ELECTROCARDIOGRAM TRACING: CPT | Performed by: EMERGENCY MEDICINE

## 2020-11-14 PROCEDURE — 93005 ELECTROCARDIOGRAM TRACING: CPT

## 2020-11-14 PROCEDURE — 85379 FIBRIN DEGRADATION QUANT: CPT

## 2020-11-14 PROCEDURE — 80053 COMPREHEN METABOLIC PANEL: CPT

## 2020-11-14 PROCEDURE — 99285 EMERGENCY DEPT VISIT HI MDM: CPT

## 2020-11-14 PROCEDURE — 85025 COMPLETE CBC W/AUTO DIFF WBC: CPT

## 2020-11-14 ASSESSMENT — FIBROSIS 4 INDEX: FIB4 SCORE: 1.32

## 2020-11-15 ENCOUNTER — APPOINTMENT (OUTPATIENT)
Dept: RADIOLOGY | Facility: MEDICAL CENTER | Age: 85
DRG: 281 | End: 2020-11-15
Attending: EMERGENCY MEDICINE
Payer: MEDICARE

## 2020-11-15 PROBLEM — I21.4 NSTEMI (NON-ST ELEVATED MYOCARDIAL INFARCTION) (HCC): Status: ACTIVE | Noted: 2020-11-15

## 2020-11-15 PROBLEM — Z95.1 HX OF CABG: Status: ACTIVE | Noted: 2020-11-15

## 2020-11-15 LAB
ALBUMIN SERPL BCP-MCNC: 3.8 G/DL (ref 3.2–4.9)
ALBUMIN/GLOB SERPL: 1.4 G/DL
ALP SERPL-CCNC: 45 U/L (ref 30–99)
ALT SERPL-CCNC: 14 U/L (ref 2–50)
ANION GAP SERPL CALC-SCNC: 11 MMOL/L (ref 7–16)
APTT PPP: 38.8 SEC (ref 24.7–36)
AST SERPL-CCNC: 13 U/L (ref 12–45)
BASOPHILS # BLD AUTO: 0.3 % (ref 0–1.8)
BASOPHILS # BLD: 0.04 K/UL (ref 0–0.12)
BILIRUB SERPL-MCNC: 0.3 MG/DL (ref 0.1–1.5)
BUN SERPL-MCNC: 28 MG/DL (ref 8–22)
CALCIUM SERPL-MCNC: 8.6 MG/DL (ref 8.5–10.5)
CHLORIDE SERPL-SCNC: 108 MMOL/L (ref 96–112)
CO2 SERPL-SCNC: 21 MMOL/L (ref 20–33)
COVID ORDER STATUS COVID19: NORMAL
CREAT SERPL-MCNC: 1.1 MG/DL (ref 0.5–1.4)
D DIMER PPP IA.FEU-MCNC: 2.45 UG/ML (FEU) (ref 0–0.5)
EKG IMPRESSION: NORMAL
EKG IMPRESSION: NORMAL
EOSINOPHIL # BLD AUTO: 0.45 K/UL (ref 0–0.51)
EOSINOPHIL NFR BLD: 3.8 % (ref 0–6.9)
ERYTHROCYTE [DISTWIDTH] IN BLOOD BY AUTOMATED COUNT: 52.9 FL (ref 35.9–50)
GLOBULIN SER CALC-MCNC: 2.7 G/DL (ref 1.9–3.5)
GLUCOSE SERPL-MCNC: 102 MG/DL (ref 65–99)
HCT VFR BLD AUTO: 42.3 % (ref 42–52)
HGB BLD-MCNC: 13.5 G/DL (ref 14–18)
IMM GRANULOCYTES # BLD AUTO: 0.07 K/UL (ref 0–0.11)
IMM GRANULOCYTES NFR BLD AUTO: 0.6 % (ref 0–0.9)
INR PPP: 1.04 (ref 0.87–1.13)
LYMPHOCYTES # BLD AUTO: 1.12 K/UL (ref 1–4.8)
LYMPHOCYTES NFR BLD: 9.5 % (ref 22–41)
MCH RBC QN AUTO: 29.2 PG (ref 27–33)
MCHC RBC AUTO-ENTMCNC: 31.9 G/DL (ref 33.7–35.3)
MCV RBC AUTO: 91.6 FL (ref 81.4–97.8)
MONOCYTES # BLD AUTO: 1.14 K/UL (ref 0–0.85)
MONOCYTES NFR BLD AUTO: 9.6 % (ref 0–13.4)
NEUTROPHILS # BLD AUTO: 9 K/UL (ref 1.82–7.42)
NEUTROPHILS NFR BLD: 76.2 % (ref 44–72)
NRBC # BLD AUTO: 0 K/UL
NRBC BLD-RTO: 0 /100 WBC
PLATELET # BLD AUTO: 221 K/UL (ref 164–446)
PMV BLD AUTO: 11.7 FL (ref 9–12.9)
POTASSIUM SERPL-SCNC: 3.9 MMOL/L (ref 3.6–5.5)
PROT SERPL-MCNC: 6.5 G/DL (ref 6–8.2)
PROTHROMBIN TIME: 14 SEC (ref 12–14.6)
RBC # BLD AUTO: 4.62 M/UL (ref 4.7–6.1)
SARS-COV-2 RNA RESP QL NAA+PROBE: NOTDETECTED
SODIUM SERPL-SCNC: 140 MMOL/L (ref 135–145)
SPECIMEN SOURCE: NORMAL
TROPONIN T SERPL-MCNC: 1192 NG/L (ref 6–19)
TROPONIN T SERPL-MCNC: 209 NG/L (ref 6–19)
TROPONIN T SERPL-MCNC: 27 NG/L (ref 6–19)
TROPONIN T SERPL-MCNC: 61 NG/L (ref 6–19)
UFH PPP CHRO-ACNC: 0.5 IU/ML
UFH PPP CHRO-ACNC: 0.61 IU/ML
WBC # BLD AUTO: 11.8 K/UL (ref 4.8–10.8)

## 2020-11-15 PROCEDURE — 84484 ASSAY OF TROPONIN QUANT: CPT

## 2020-11-15 PROCEDURE — 96366 THER/PROPH/DIAG IV INF ADDON: CPT

## 2020-11-15 PROCEDURE — 93005 ELECTROCARDIOGRAM TRACING: CPT | Performed by: STUDENT IN AN ORGANIZED HEALTH CARE EDUCATION/TRAINING PROGRAM

## 2020-11-15 PROCEDURE — 700102 HCHG RX REV CODE 250 W/ 637 OVERRIDE(OP): Performed by: STUDENT IN AN ORGANIZED HEALTH CARE EDUCATION/TRAINING PROGRAM

## 2020-11-15 PROCEDURE — 85520 HEPARIN ASSAY: CPT | Mod: 91

## 2020-11-15 PROCEDURE — G0378 HOSPITAL OBSERVATION PER HR: HCPCS

## 2020-11-15 PROCEDURE — 700117 HCHG RX CONTRAST REV CODE 255: Performed by: EMERGENCY MEDICINE

## 2020-11-15 PROCEDURE — 71045 X-RAY EXAM CHEST 1 VIEW: CPT

## 2020-11-15 PROCEDURE — 96365 THER/PROPH/DIAG IV INF INIT: CPT

## 2020-11-15 PROCEDURE — 71275 CT ANGIOGRAPHY CHEST: CPT

## 2020-11-15 PROCEDURE — 96374 THER/PROPH/DIAG INJ IV PUSH: CPT

## 2020-11-15 PROCEDURE — 96375 TX/PRO/DX INJ NEW DRUG ADDON: CPT

## 2020-11-15 PROCEDURE — 700102 HCHG RX REV CODE 250 W/ 637 OVERRIDE(OP): Performed by: INTERNAL MEDICINE

## 2020-11-15 PROCEDURE — 700111 HCHG RX REV CODE 636 W/ 250 OVERRIDE (IP): Performed by: EMERGENCY MEDICINE

## 2020-11-15 PROCEDURE — 700111 HCHG RX REV CODE 636 W/ 250 OVERRIDE (IP): Performed by: STUDENT IN AN ORGANIZED HEALTH CARE EDUCATION/TRAINING PROGRAM

## 2020-11-15 PROCEDURE — 99358 PROLONG SERVICE W/O CONTACT: CPT | Performed by: INTERNAL MEDICINE

## 2020-11-15 PROCEDURE — 99218 PR INITIAL OBSERVATION CARE,LEVL I: CPT | Performed by: STUDENT IN AN ORGANIZED HEALTH CARE EDUCATION/TRAINING PROGRAM

## 2020-11-15 PROCEDURE — 96372 THER/PROPH/DIAG INJ SC/IM: CPT

## 2020-11-15 PROCEDURE — 36415 COLL VENOUS BLD VENIPUNCTURE: CPT

## 2020-11-15 PROCEDURE — U0003 INFECTIOUS AGENT DETECTION BY NUCLEIC ACID (DNA OR RNA); SEVERE ACUTE RESPIRATORY SYNDROME CORONAVIRUS 2 (SARS-COV-2) (CORONAVIRUS DISEASE [COVID-19]), AMPLIFIED PROBE TECHNIQUE, MAKING USE OF HIGH THROUGHPUT TECHNOLOGIES AS DESCRIBED BY CMS-2020-01-R: HCPCS

## 2020-11-15 PROCEDURE — 93010 ELECTROCARDIOGRAM REPORT: CPT | Performed by: INTERNAL MEDICINE

## 2020-11-15 PROCEDURE — 99205 OFFICE O/P NEW HI 60 MIN: CPT | Performed by: INTERNAL MEDICINE

## 2020-11-15 PROCEDURE — 700102 HCHG RX REV CODE 250 W/ 637 OVERRIDE(OP)

## 2020-11-15 PROCEDURE — A9270 NON-COVERED ITEM OR SERVICE: HCPCS | Performed by: INTERNAL MEDICINE

## 2020-11-15 PROCEDURE — A9270 NON-COVERED ITEM OR SERVICE: HCPCS | Performed by: STUDENT IN AN ORGANIZED HEALTH CARE EDUCATION/TRAINING PROGRAM

## 2020-11-15 PROCEDURE — 85730 THROMBOPLASTIN TIME PARTIAL: CPT

## 2020-11-15 PROCEDURE — 85610 PROTHROMBIN TIME: CPT

## 2020-11-15 PROCEDURE — A9270 NON-COVERED ITEM OR SERVICE: HCPCS

## 2020-11-15 PROCEDURE — 96376 TX/PRO/DX INJ SAME DRUG ADON: CPT

## 2020-11-15 RX ORDER — ATORVASTATIN CALCIUM 40 MG/1
40 TABLET, FILM COATED ORAL EVERY EVENING
Status: DISCONTINUED | OUTPATIENT
Start: 2020-11-15 | End: 2020-11-17 | Stop reason: HOSPADM

## 2020-11-15 RX ORDER — HEPARIN SODIUM 1000 [USP'U]/ML
2000 INJECTION, SOLUTION INTRAVENOUS; SUBCUTANEOUS PRN
Status: DISCONTINUED | OUTPATIENT
Start: 2020-11-15 | End: 2020-11-17

## 2020-11-15 RX ORDER — HEPARIN SODIUM 1000 [USP'U]/ML
4000 INJECTION, SOLUTION INTRAVENOUS; SUBCUTANEOUS ONCE
Status: COMPLETED | OUTPATIENT
Start: 2020-11-15 | End: 2020-11-15

## 2020-11-15 RX ORDER — OMEPRAZOLE 20 MG/1
20 CAPSULE, DELAYED RELEASE ORAL DAILY
Status: DISCONTINUED | OUTPATIENT
Start: 2020-11-15 | End: 2020-11-17 | Stop reason: HOSPADM

## 2020-11-15 RX ORDER — TAMSULOSIN HYDROCHLORIDE 0.4 MG/1
0.4 CAPSULE ORAL
Status: DISCONTINUED | OUTPATIENT
Start: 2020-11-15 | End: 2020-11-17 | Stop reason: HOSPADM

## 2020-11-15 RX ORDER — ACETAMINOPHEN 325 MG/1
650 TABLET ORAL EVERY 6 HOURS PRN
Status: DISCONTINUED | OUTPATIENT
Start: 2020-11-15 | End: 2020-11-17 | Stop reason: HOSPADM

## 2020-11-15 RX ORDER — HEPARIN SODIUM 5000 [USP'U]/100ML
0-30 INJECTION, SOLUTION INTRAVENOUS CONTINUOUS
Status: DISCONTINUED | OUTPATIENT
Start: 2020-11-15 | End: 2020-11-17

## 2020-11-15 RX ORDER — NITROGLYCERIN 0.4 MG/1
TABLET SUBLINGUAL
Status: COMPLETED
Start: 2020-11-15 | End: 2020-11-15

## 2020-11-15 RX ADMIN — ATORVASTATIN CALCIUM 40 MG: 40 TABLET, FILM COATED ORAL at 17:49

## 2020-11-15 RX ADMIN — HEPARIN SODIUM 4000 UNITS: 1000 INJECTION, SOLUTION INTRAVENOUS; SUBCUTANEOUS at 13:47

## 2020-11-15 RX ADMIN — HEPARIN SODIUM 12 UNITS/KG/HR: 5000 INJECTION, SOLUTION INTRAVENOUS at 13:56

## 2020-11-15 RX ADMIN — TAMSULOSIN HYDROCHLORIDE 0.4 MG: 0.4 CAPSULE ORAL at 10:00

## 2020-11-15 RX ADMIN — ACETAMINOPHEN 650 MG: 325 TABLET, FILM COATED ORAL at 05:30

## 2020-11-15 RX ADMIN — ASPIRIN 81 MG: 81 TABLET, COATED ORAL at 13:43

## 2020-11-15 RX ADMIN — IOHEXOL 67 ML: 350 INJECTION, SOLUTION INTRAVENOUS at 02:09

## 2020-11-15 RX ADMIN — NITROGLYCERIN 0.4 MG: 0.4 TABLET, ORALLY DISINTEGRATING SUBLINGUAL at 00:30

## 2020-11-15 RX ADMIN — FENTANYL CITRATE 50 MCG: 50 INJECTION, SOLUTION INTRAMUSCULAR; INTRAVENOUS at 01:38

## 2020-11-15 RX ADMIN — OMEPRAZOLE 20 MG: 20 CAPSULE, DELAYED RELEASE ORAL at 10:00

## 2020-11-15 RX ADMIN — ENOXAPARIN SODIUM 100 MG: 100 INJECTION SUBCUTANEOUS at 00:40

## 2020-11-15 ASSESSMENT — ENCOUNTER SYMPTOMS
DEPRESSION: 0
COUGH: 0
CLAUDICATION: 0
WEAKNESS: 0
BLURRED VISION: 0
VOMITING: 0
MYALGIAS: 0
FEVER: 0
DIZZINESS: 0
SHORTNESS OF BREATH: 0
MUSCULOSKELETAL NEGATIVE: 1
RESPIRATORY NEGATIVE: 1
NAUSEA: 0
PALPITATIONS: 0
PND: 0
CONSTITUTIONAL NEGATIVE: 1
ABDOMINAL PAIN: 0
SORE THROAT: 0
EYES NEGATIVE: 1
GASTROINTESTINAL NEGATIVE: 1
NERVOUS/ANXIOUS: 0
DOUBLE VISION: 0
PSYCHIATRIC NEGATIVE: 1
NEUROLOGICAL NEGATIVE: 1
HEADACHES: 0
ORTHOPNEA: 0

## 2020-11-15 ASSESSMENT — FIBROSIS 4 INDEX
FIB4 SCORE: 1.43
FIB4 SCORE: 1.43

## 2020-11-15 ASSESSMENT — PATIENT HEALTH QUESTIONNAIRE - PHQ9
SUM OF ALL RESPONSES TO PHQ9 QUESTIONS 1 AND 2: 0
2. FEELING DOWN, DEPRESSED, IRRITABLE, OR HOPELESS: NOT AT ALL
1. LITTLE INTEREST OR PLEASURE IN DOING THINGS: NOT AT ALL

## 2020-11-15 NOTE — PROGRESS NOTES
Shola from Lab called with critical result of 1192 at 0940. Critical lab result read back to Shola.   Dr. Mahoney notified of critical lab result at 0945.  Troponin 1192 result read back by Dr. Drake.

## 2020-11-15 NOTE — PROGRESS NOTES
Received report from SCARLETT De Luna (ED). Patient is A&Ox1 to self. Patient transported via [facility] with ACLS RN and Zoll monitor. Patient arrived to unit, placed on tele box. Confirmed Paced with monitor room. Patient ambulated steadily with standby [facility] hospital [facility], tolerated well. Pt oriented to unit and call light, verbalized understanding. Fall precautions in place. Call light and belongings within reach. Bed locked and in lowest position. Assessment completed, will continue to monitor.

## 2020-11-15 NOTE — ED PROVIDER NOTES
ED Provider Note    Scribed for Tanya Villalpando M.D. by Waleska Sullivan. 11/14/2020, 11:53 PM.    I verified that the patient was wearing a mask and I was wearing appropriate PPE every time I entered the room. The patient's mask was on the patient at all times during my encounter except for a brief view of the oropharynx.    Primary care provider: Phil Mcgee M.D.  Means of arrival: EMS  History obtained from: patient  History limited by: none    CHIEF COMPLAINT  Chief Complaint   Patient presents with   • Chest Pain       HPI  Anthony Coleman is a 89 y.o. male with past medical history significant for CABG and pacemaker who presents to the Emergency Department via EMS with waxing and waning chest pain onset at 10:45 PM. He describes his pain as radiating from the left side to his right shoulder and armpit. Per the patient, the pain woke him up from his sleep. He states that when his pain occurs, it's a 7/10 or 8/10; he was given aspirin and nitroglycerin by EMS after which his pain greatly improved.  Currently in the ED, he rates his pain at 1/10.The patient denies shortness of breath. No exacerbating or alleviating factors were noted.  He does not have a cardiologist in [place], nor does he currently see a cardiologist.  Although patient seems slightly confused regarding his history.      On arrival he was noted to be slightly hypoxic at 85% on room air, however he was titrated off oxygen and during my assessment his saturation is in the low 90s.    REVIEW OF SYSTEMS  Review of Systems   Constitutional: Negative for fever.   Respiratory: Negative for shortness of breath.    Cardiovascular: Positive for chest pain.   Gastrointestinal: Negative for abdominal pain, nausea and vomiting.   Neurological: Negative for headaches.   All other systems reviewed and are negative.      PAST MEDICAL HISTORY   has a past medical history of Arthritis, CAD (coronary artery disease), CKD (chronic kidney disease) stage 3, GFR  30-59 ml/min (HCC) (6/4/2018), Depression, Erectile dysfunction, Heart burn, Heart valve disease, Hip replacement arthroplasty (bilateral), Hyperlipemia, Hypertension, Hypogonadism, Indigestion, Pain (12-31-12), Sarcoma (HCC), and VENTRAL HERNIA (Repaired).    SURGICAL HISTORY   has a past surgical history that includes coronary artery bypass, 3; ventral hernia repair; hip arthroplasty total; shoulder arthroplasty total (2000); laparoscopy (1/8/2013); and mass excision general (1/8/2013).    SOCIAL HISTORY  Social History     Tobacco Use   • Smoking status: Never Smoker   • Smokeless tobacco: Never Used   Substance Use Topics   • Alcohol use: Yes     Alcohol/week: 7.0 oz     Types: 14 drink(s) per week     Comment: daily, 2 glasses of wine   • Drug use: No      Social History     Substance and Sexual Activity   Drug Use No       FAMILY HISTORY  None pertinent    CURRENT MEDICATIONS  Current Outpatient Medications   Medication Instructions   • escitalopram (LEXAPRO) 20 mg, Oral, DAILY   • esomeprazole (NEXIUM) 20 mg, Oral, EVERY MORNING BEFORE BREAKFAST   • meclizine (ANTIVERT) 25 mg, Oral, 3 TIMES DAILY PRN   • tamsulosin (FLOMAX) 0.4 mg, Oral, AFTER BREAKFAST     ALLERGIES  Allergies   Allergen Reactions   • Pcn [Penicillins] Itching   • Sulfa Drugs Itching       PHYSICAL EXAM  VITAL SIGNS: /78   Pulse 80   Temp 36.3 °C (97.3 °F) (Tympanic)   Resp 18   Wt 95.3 kg (210 lb)   SpO2 99%   BMI 31.93 kg/m²   Vitals reviewed by myself.  Physical Exam  Nursing note and vitals reviewed.  Constitutional: Well-developed and well-nourished. No acute distress.  HENT: Head is normocephalic and atraumatic.  Eyes: extra-ocular movements intact  Cardiovascular: Regular rate and regular rhythm. No murmur heard.  Pulmonary/Chest: Breath sounds normal. No wheezes or rales. Sternotomy scar noted on exam  Abdominal: Soft and non-tender. No distention.    Musculoskeletal: Extremities exhibit normal range of motion without  edema or tenderness.   Neurological: Awake and alert  Skin: Skin is warm and dry. No rash.     DIAGNOSTIC STUDIES /  LABS  Labs Reviewed   CBC WITH DIFFERENTIAL - Abnormal; Notable for the following components:       Result Value    WBC 11.8 (*)     RBC 4.62 (*)     Hemoglobin 13.5 (*)     MCHC 31.9 (*)     RDW 52.9 (*)     Neutrophils-Polys 76.20 (*)     Lymphocytes 9.50 (*)     Neutrophils (Absolute) 9.00 (*)     Monos (Absolute) 1.14 (*)     All other components within normal limits   COMP METABOLIC PANEL - Abnormal; Notable for the following components:    Glucose 102 (*)     Bun 28 (*)     All other components within normal limits   TROPONIN - Abnormal; Notable for the following components:    Troponin T 27 (*)     All other components within normal limits   D-DIMER - Abnormal; Notable for the following components:    D-Dimer Screen 2.45 (*)     All other components within normal limits   ESTIMATED GFR   COVID/SARS COV-2    Narrative:     Is patient being admitted?->No  Expected turn around time?->Routine (In-House PCR up to 24  hours)  Is this the patients First SARS CoV-2 test?->No  Is this patient employed in healthcare?->No  Is the patient symptomatic as defined by the CDC?->No  Is the patient hospitalized?->No  Is the patient a resident in a congregate care setting?->No  Is the patient pregnant?->No   TROPONIN   TROPONIN       All labs reviewed by me.    EKG Interpretation:  Interpreted by myself    EKG at 11:49 PM: Atrial paced rhythm, heart rate 82, normal axis,  , , QTc mildly prolonged at 505, when compared to EKG from September 2020 patient still has a paced rhythm however his T waves in inferolateral leads are now deeply inverted with associated ST depression, no ST elevation, EKG is concerning for ischemia    RADIOLOGY  DX-CHEST-PORTABLE (1 VIEW)   Final Result      No acute cardiopulmonary abnormality.      CT-CTA CHEST PULMONARY ARTERY W/ RECONS    (Results Pending)     The  radiologist's interpretation of all radiological studies have been reviewed by me.    REASSESSMENT    11:54 PM - Patient seen and examined at bedside. Discussed plan of care, including lab work and imaging to evaluate, and speaking to a cardiologist regarding his symptoms. Patient agrees to the plan of care.     12:00 AM - I spoke with Dr. Ballard (Cardiology), who agreed to review the patient's EKG and will get back to me    12:09 AM - I discussed the patient's case and the above findings with Dr. Ballard (Cardiology) who suggested that abnormalities on his EKG do not represent STEMI, if story is concerning enough recommends initiating anticoagulation for NSTEMI.  Recommends cardiac work-up and reconsult cardiology as needed.    12:22 AM - I discussed the patient's case and the above findings with the pharmacist, who agreed with my plans of administering Lovenox and recommended additionally medicating with Nitroglycerine.    12:26 AM - Patient will be treated with Lovenox 100 mg and Nitroglycerine 0.4 mg.     12:29 AM - Recheck: Patient re-evaluated at beside. Discussed patient's condition and treatment plan, including the need for hospitalization. Patient's lab results discussed. The patient understood and is in agreement.     12:41 AM-  I discussed the patient's case and the above findings with Dr. Martinez (Hospitalist) who agreed to hospitalize the patient.  I spoke with patient's wife over the phone and updated her on plan of care.      COURSE & MEDICAL DECISION MAKING  Nursing notes, VS, PMSFHx reviewed in chart.    Patient is a 89-year-old male who comes in for evaluation of chest pain.  Differential diagnosis includes acute coronary syndrome, arrhythmia, electrolyte disturbance, costochondritis.  Diagnostic work-up includes labs, EKG and chest x-ray.    Patient's initial vitals are within normal limits.  Patient's pain is currently resolved however during his emergency department course it does return and he is  further treated with nitroglycerin and fentanyl.  Initial EKG returns and is notable for deep T wave inversions with ST depressions in inferolateral leads.  I discussed the case with Dr. Ballard who advises that this EKG does not meet STEMI criteria at this time.  Recommends trending troponin and if story is concerning enough initiating anticoagulation.  Cardiology can be reconsulted as needed.  Labs returned and troponin is noted to be mildly elevated at 27, given his concerning presentation and T wave inversions I will initiate him on weight-based Lovenox.  Also patient is not hypoxic in the emergency department however given his initial hypoxemia of 85% I ordered a D-dimer to assess for possible PE.  D-dimer is elevated 2.45.  CTA of the chest is pending, however regardless he is already started on anticoagulation if he does have a pulmonary embolus.  Therefore I will go ahead and hospitalize patient at this time for further cardiac work-up while CT is pending.  I discussed the case with Dr. Martinez who has accepted patient for hospitalization.  Patient is in guarded condition.    I discussed patient's CODE STATUS with him and he is DNR/DNI.  I spoke with patient's wife was updated on plan of care and is amenable to the plan.    DISPOSITION:  Patient will be hospitalized by Dr. Martinez in guarded condition.      FINAL IMPRESSION  1. Chest pain, unspecified type    2. Elevated troponin          I, Waleska Sullivan (Scribsánchez), am scribing for, and in the presence of, Tanya Villalpando M.D..    Electronically signed by: Waleska Sullivan (Raymondibsánchez), 11/14/2020    ITanya M.D. personally performed the services described in this documentation, as scribed by Waleska Sullivan in my presence, and it is both accurate and complete.    C    The note accurately reflects work and decisions made by me.  Tanya Villalpando M.D.  11/15/2020  1:43 AM

## 2020-11-15 NOTE — PROGRESS NOTES
Daily Progress Note:     Date of Service: 11/15/2020  Primary Team: [employer] [facility] Blue Team   Attending: Blayne Robertson M.D.   Senior Resident: Dr. valdez  Intern: Dr. Drake  Contact:  [phone]    Chief Complaint:   Chest pain    Subjective:   Admitted overnight, awoken at 11pm for chest pain into L arm. Elevation in troponins, has dual chamber pacer, no EKG changes.  Today: continues to have chest pressure focused near the L clavicle, no longer radiating down arm/neck/back. No nausea/vomiting, diaphoresis, SOB, vision changes, abd pain. States that he is unsure wife knows where he is, he does not have phone and doesn't recall a number to call her.    Consultants/Specialty:  Cardiology    Review of Systems:    Review of Systems   HENT: Negative for congestion, nosebleeds and sore throat.    Eyes: Negative for blurred vision and double vision.   Respiratory: Negative for cough and shortness of breath.    Cardiovascular: Positive for chest pain. Negative for palpitations and leg swelling.        Focused near L clavicle, described as pressure, nonradiating, tolerable.   Gastrointestinal: Negative for abdominal pain, nausea and vomiting.   Musculoskeletal: Negative for joint pain and myalgias.   Neurological: Negative for dizziness, weakness and headaches.   Psychiatric/Behavioral: Negative for depression. The patient is not nervous/anxious.        Objective Data:   Physical Exam:   Vitals:   Temp:  [35.8 °C (96.5 °F)-36.4 °C (97.5 °F)] 35.8 °C (96.5 °F)  Pulse:  [68-82] 74  Resp:  [16-18] 16  BP: (101-153)/(51-82) 129/66  SpO2:  [91 %-99 %] 92 %     Physical Exam  Vitals signs and nursing note reviewed.   Constitutional:       General: He is not in acute distress.     Appearance: Normal appearance. He is not ill-appearing or diaphoretic.   HENT:      Head: Normocephalic and atraumatic.      Mouth/Throat:      Mouth: Mucous membranes are moist.      Pharynx: Oropharynx is clear.   Eyes:      Extraocular Movements:  Extraocular movements intact.      Pupils: Pupils are equal, round, and reactive to light.   Cardiovascular:      Rate and Rhythm: Normal rate and regular rhythm.      Heart sounds: No murmur. No friction rub. No gallop.    Pulmonary:      Effort: Pulmonary effort is normal.      Breath sounds: Normal breath sounds.   Abdominal:      General: There is no distension.      Palpations: Abdomen is soft.      Tenderness: There is no abdominal tenderness. There is no guarding or rebound.   Musculoskeletal:      Comments: Shoulder, elbow flexion/extension 5/5 bilaterally.   Skin:     General: Skin is warm and dry.   Neurological:      Mental Status: He is alert.      Comments: Alert, oriented x2 (self, general location). CN II-XII intact.   Psychiatric:         Mood and Affect: Mood normal.         Behavior: Behavior normal.         Thought Content: Thought content normal.      Comments: Affable, conversations are directed, though clear aspects of short term memory loss.            Labs:   Lab Results   Component Value Date/Time    SODIUM 140 11/14/2020 11:47 PM    POTASSIUM 3.9 11/14/2020 11:47 PM    CHLORIDE 108 11/14/2020 11:47 PM    CO2 21 11/14/2020 11:47 PM    GLUCOSE 102 (H) 11/14/2020 11:47 PM    BUN 28 (H) 11/14/2020 11:47 PM    CREATININE 1.10 11/14/2020 11:47 PM      Recent Labs     11/14/20  2347   WBC 11.8*   RBC 4.62*   HEMOGLOBIN 13.5*   HEMATOCRIT 42.3   MCV 91.6   MCH 29.2   RDW 52.9*   PLATELETCT 221   MPV 11.7   NEUTSPOLYS 76.20*   LYMPHOCYTES 9.50*   MONOCYTES 9.60   EOSINOPHILS 3.80   BASOPHILS 0.30       Imaging:   CXR 11/15:  No acute cardiopulmonary abnormality.    CT-PE 11/15:  No evidence of pulmonary emboli.    Anthony Coleman is a 89yo M with hx of CAD (s/p CABG), CKD3, HTN, HLD, GERD; presenting for awakening chest pain radiating into L arm/axilla; found to have elevated troponin without EKG changes (paced); undergoing medication management for NSTEMI.    * NSTEMI (non-ST elevated myocardial  infarction) ([facility])  Assessment & Plan  Presented with acute onset chest pressure, SOB. Was found to have elevated troponin of 27, now up to 1192. EKG dual chamber paced, no indication of ST elevation. Notes indicate family inclination towards minimizing interventions, no anticoagulation. Was given lovenox in ED acutely. Hemodynamically stable. Family was called morning of 11/15: sons demanding to talk to cardiologist, not hospitalist team. Family preference is to medical management opposed to intervention.  -Cardiology consulted, appreciate help on family care planning  -ASA 81mg  -Atorvastatin 40mg  -Heparin gtt    Hx of CABG  Assessment & Plan  History of CAD with CABG. Also has a dual chamber pacer, set at 75bpm. Currently undergoing evaluation for NSTEMI.    Gastroesophageal reflux disease without esophagitis- (present on admission)  Assessment & Plan  Hx of GERD. No reported symptoms during admission.  -omeprazole 20mg PO qD    CKD (chronic kidney disease) stage 3, GFR 30-59 ml/min- (present on admission)  Assessment & Plan  Renal function appears to be at baseline, creatinine 1.1.  -repeat BMP in AM for signs exacerbation    Hypertension- (present on admission)  Assessment & Plan  Hx of hypertension. BP has been stable on admission. Continue to monitor for need to start antihypertensive.

## 2020-11-15 NOTE — DISCHARGE PLANNING
Care Transition Team Assessment    Information Source  Information Given By: (information obtained via chart review during COVID Pandemic )  Who is responsible for making decisions for patient? : POA  Name(s) of Primary Decision Maker: Yumiko Coleman    Readmission Evaluation  Is this a readmission?: No    Elopement Risk  Legal Hold: No    Interdisciplinary Discharge Planning  Does Admitting Nurse Feel This Could be a Complex Discharge?: No  Primary Care Physician: Phil Mcgee  Lives with - Patient's Self Care Capacity: Spouse  Support Systems: Children, Spouse / Significant Other  Able to Return to Previous ADL's: Yes  Mobility Issues: Yes(uses SPC)  Prior Services: Other (Comments)(caregivers)  Assistance Needed: No  Durable Medical Equipment: Other - Specify(SPC)    Discharge Preparedness  What are your discharge supports?: Child, Spouse  Prior Functional Level: Ambulatory, Needs Assist with Activities of Daily Living, Needs Assist with Medication Management  Difficulity with ADLs: None  Difficulity with IADLs: Cooking, Driving, Keeping track of finances, Laundry, Managing medication, Shopping    Functional Assesment  Prior Functional Level: Ambulatory, Needs Assist with Activities of Daily Living, Needs Assist with Medication Management    Finances  Financial Barriers to Discharge: No  Prescription Coverage: Yes    Vision / Hearing Impairment  Vision Impairment : Yes  Right Eye Vision: Patient Declines to Wear Visual Aid  Left Eye Vision: Patient Declines to Wear Visual Aid  Hearing Impairment : Yes  Hearing Impairment: Both Ears         Advance Directive  Advance Directive?: DPOA for Health Care, POLST  Durable Power of  Name and Contact : Yumiko Coleman [phone]              Discharge Risks or Barriers  Discharge risks or barriers?: No  Patient risk factors: Cognitive / sensory / physical deficit    Anticipated Discharge Information  Discharge Disposition: (home with spouse)  Discharge Address:  [address] [place]  Discharge Contact Phone Number: [phone]

## 2020-11-15 NOTE — ASSESSMENT & PLAN NOTE
Addended by: TYRONE WEI on: 11/20/2019 01:07 PM     Modules accepted: Orders     Renal function appears to be at baseline, creatinine 1.1.  -repeat BMP in AM for signs exacerbation

## 2020-11-15 NOTE — ASSESSMENT & PLAN NOTE
Hx of hypertension. BP has been stable on admission. SBP goal is >140, 120 if tolerated without orthostatic hypotension  -lisinopril 2.5mg PO qD

## 2020-11-15 NOTE — ED TRIAGE NOTES
Anthony Coleman   89 y.o. male   Chief Complaint   Patient presents with   • Chest Pain      The patient presents to the ED via EMS for evaluation of an acute onset of chest pain that woke the patient from sleep about 45 min ago. The patient reports that it radiates to his right shoulder and arm pit. The patient denies shortness of breath or any other exacerbating symptoms. The patient's room air saturation was 85 percent. The patient states that he does not use oxygen in the home setting. Previous CVA and open heart history. Per EMS, the patient received aspirin and one tab of nitro en route, which completely alleviated the patients pain.     The patient denies knowingly being around anyone with suspected or confirmed COVID 19.    /78   Pulse 80   Temp 36.3 °C (97.3 °F) (Tympanic)   Resp 18   Wt 95.3 kg (210 lb)   SpO2 99%   BMI 31.93 kg/m²

## 2020-11-15 NOTE — H&P
Hospital Medicine History & Physical Note    Date of Service  11/15/2020    Primary Care Physician  Phil Mcgee M.D.    Consultants  Cardiology    Code Status  Prior    Chief Complaint  Chief Complaint   Patient presents with   • Chest Pain       History of Presenting Illness  89 y.o. male who presented 11/14/2020 with chest pain that started as patient from sleep today 11 PM.  He reports chest pain is pressure-like and radiating to left arm.  He then called EMS to which he was administered a loading dose of aspirin and nitroglycerin.    In ED, patient found to have normal vital signs.  Remarkable labs include neutrophilic leukocytosis, Hgb 13.5, pre renal azotemia, troponin 27.  Chest x-ray shows no acute cardiopulmonary abnormality, sternotomy wires are present.  EKG shows atrial paced complexes.  Patient was given 100 mg of Lovenox in ED and endorsed internal medicine.    Per chart review, patient was admitted to [facility] September 2020.  Patient is pacemaker was interrogated during that time.  He is also noted to have atrial fibrillation a flutter but however was recommended against anticoagulation by his family due to his age and risk of bleeding.    Review of Systems  Review of Systems   Constitutional: Negative.    HENT: Negative.    Eyes: Negative.    Respiratory: Negative.    Cardiovascular: Positive for chest pain. Negative for palpitations, orthopnea, claudication, leg swelling and PND.   Gastrointestinal: Negative.    Genitourinary: Negative.    Musculoskeletal: Negative.    Skin: Negative.    Neurological: Negative.    Endo/Heme/Allergies: Negative.    Psychiatric/Behavioral: Negative.          Past Medical History   has a past medical history of Arthritis, CAD (coronary artery disease), CKD (chronic kidney disease) stage 3, GFR 30-59 ml/min (formerly [facility]) (6/4/2018), Depression, Erectile dysfunction, Heart burn, Heart valve disease, Hip replacement arthroplasty (bilateral), Hyperlipemia, Hypertension,  Hypogonadism, Indigestion, Pain (12-31-12), Sarcoma (HCC), and VENTRAL HERNIA (Repaired).    Surgical History   has a past surgical history that includes coronary artery bypass, 3; ventral hernia repair; hip arthroplasty total; shoulder arthroplasty total (2000); laparoscopy (1/8/2013); and mass excision general (1/8/2013).     Family History  family history is not on file.     Social History   reports that he has never smoked. He has never used smokeless tobacco. He reports current alcohol use of about 7.0 oz of alcohol per week. He reports that he does not use drugs.    Allergies  Allergies   Allergen Reactions   • Pcn [Penicillins] Itching   • Sulfa Drugs Itching       Medications  Prior to Admission Medications   Prescriptions Last Dose Informant Patient Reported? Taking?   escitalopram (LEXAPRO) 20 MG tablet  Patient No No   Sig: Take 1 Tab by mouth every day.   esomeprazole (NEXIUM) 20 MG capsule  Patient No No   Sig: TAKE 1 CAP BY MOUTH EVERY MORNING BEFORE BREAKFAST.   meclizine (ANTIVERT) 25 MG Tab   No No   Sig: Take 1 Tab by mouth 3 times a day as needed.   tamsulosin (FLOMAX) 0.4 MG capsule  Patient No No   Sig: TAKE 1 CAP BY MOUTH ONE-HALF HOUR AFTER BREAKFAST.      Facility-Administered Medications: None       Physical Exam  Temp:  [36.3 °C (97.3 °F)] 36.3 °C (97.3 °F)  Pulse:  [80] 80  Resp:  [18] 18  BP: (145)/(78) 145/78  SpO2:  [99 %] 99 %    Physical Exam  Constitutional:       Appearance: Normal appearance. He is obese.   HENT:      Head: Normocephalic.   Cardiovascular:      Rate and Rhythm: Normal rate and regular rhythm.      Comments: Vertical incision scar noted on chest  Left pacemaker palpated  Gynecomastia noted bilaterally  Pulmonary:      Effort: Pulmonary effort is normal.      Breath sounds: Normal breath sounds.   Abdominal:      General: Abdomen is flat.   Musculoskeletal: Normal range of motion.         General: No swelling.   Skin:     General: Skin is warm.   Neurological:       General: No focal deficit present.      Mental Status: He is alert and oriented to person, place, and time.           Laboratory:  Recent Labs     11/14/20  2347   WBC 11.8*   RBC 4.62*   HEMOGLOBIN 13.5*   HEMATOCRIT 42.3   MCV 91.6   MCH 29.2   MCHC 31.9*   RDW 52.9*   PLATELETCT 221   MPV 11.7     Recent Labs     11/14/20  2347   SODIUM 140   POTASSIUM 3.9   CHLORIDE 108   CO2 21   GLUCOSE 102*   BUN 28*   CREATININE 1.10   CALCIUM 8.6     Recent Labs     11/14/20  2347   ALTSGPT 14   ASTSGOT 13   ALKPHOSPHAT 45   TBILIRUBIN 0.3   GLUCOSE 102*         No results for input(s): NTPROBNP in the last 72 hours.      Recent Labs     11/14/20  2347   TROPONINT 27*       Imaging:  DX-CHEST-PORTABLE (1 VIEW)    (Results Pending)         Assessment/Plan:  I anticipate this patient is appropriate for observation status at this time.    * NSTEMI (non-ST elevated myocardial infarction) (HCC)  Assessment & Plan  Admit patient to telemetry for NSTEMI  Loaded with aspirin by EMS  EKG shows an atrial paced complexes and T wave inversions in lateral leads  Initial troponin 27, given 100 mg Lovenox in ED, trend troponin  Echocardiogram  Cardiology consulted, follow official note in a.m.  Cardiac monitor    Hx of CABG  Assessment & Plan  Cardiac monitor  Being worked up for ACS    Hypertension- (present on admission)  Assessment & Plan  Hypertensive medications as needed

## 2020-11-15 NOTE — CONSULTS
Cardiology Initial Consult Note    Date of note:    11/15/2020      Consulting Physician: Blayne Robertson M.D.    Name:   Anthony Coleman   YOB: 1929  Age:   89 y.o.  male   MRN:   [id number]      Reason for Consultation: NSTEMI    HPI:  Anthony Coleman is a 89 y.o.-year-old male with a history of CAD with previous CABG, pacemaker and paroxysmal A. Fib/flutter not on anticoagulation per family's request and dementia who presented to [facility] ED on 11/14/2020 with chief complaint of waxing and waning chest pain which started last night.  Patient states that he woke up from his sleep with left-sided chest pain radiating to his right shoulder and armpit.  He received aspirin and nitroglycerin via EMS with improvement in his chest pain.    Upon arrival to the ED, patient received 100 mg of Lovenox.  No acute events overnight.  Troponin was trended and is 27, 61, 209.  Patient is currently on no antiplatelet or anticoagulation therapy based on family's decision given underlying dementia.      Review of Systems   Unable to perform ROS: dementia       Past Medical History:   Diagnosis Date   • Arthritis    • CAD (coronary artery disease)     stents   • CKD (chronic kidney disease) stage 3, GFR 30-59 ml/min (Former[facility]) 6/4/2018   • Depression    • Erectile dysfunction    • Heart burn    • Heart valve disease    • Hip replacement arthroplasty bilateral   • Hyperlipemia    • Hypertension    • Hypogonadism    • Indigestion    • Pain 12-31-12    left flank, 7/10   • Sarcoma (Formerly [facility])    • VENTRAL HERNIA Repaired       Past Surgical History:   Procedure Laterality Date   • LAPAROSCOPY  1/8/2013    Performed by Rodrick Ruiz M.D. at SURGERY [facility]   • MASS EXCISION GENERAL  1/8/2013    Performed by Rodrick Ruiz M.D. at SURGERY [facility]   • SHOULDER ARTHROPLASTY TOTAL  2000   • CORONARY ARTERY BYPASS, 3     • HIP ARTHROPLASTY TOTAL      bilateral   • VENTRAL HERNIA REPAIR            Medications Prior to Admission   Medication Sig Dispense Refill Last Dose   • meclizine (ANTIVERT) 25 MG Tab Take 1 Tab by mouth 3 times a day as needed. 30 Tab 0    • tamsulosin (FLOMAX) 0.4 MG capsule TAKE 1 CAP BY MOUTH ONE-HALF HOUR AFTER BREAKFAST. 90 Cap 2    • escitalopram (LEXAPRO) 20 MG tablet Take 1 Tab by mouth every day. 30 Tab 0    • esomeprazole (NEXIUM) 20 MG capsule TAKE 1 CAP BY MOUTH EVERY MORNING BEFORE BREAKFAST. 90 Cap 3      Current Facility-Administered Medications   Medication Dose Route Frequency Provider Last Rate Last Admin   • acetaminophen (Tylenol) tablet 650 mg  650 mg Oral Q6HRS PRN Rodrick Martinez M.D.   650 mg at 11/15/20 0530   • tamsulosin (FLOMAX) capsule 0.4 mg  0.4 mg Oral AFTER BREAKFAST Oren Drake M.D.       • omeprazole (PRILOSEC) capsule 20 mg  20 mg Oral DAILY Blayne Robertson M.D.             Allergies   Allergen Reactions   • Pcn [Penicillins] Itching   • Sulfa Drugs Itching         No family history on file.      Social History     Socioeconomic History   • Marital status:      Spouse name: Not on file   • Number of children: Not on file   • Years of education: Not on file   • Highest education level: Not on file   Occupational History   • Not on file   Social Needs   • Financial resource strain: Not on file   • Food insecurity     Worry: Not on file     Inability: Not on file   • Transportation needs     Medical: Not on file     Non-medical: Not on file   Tobacco Use   • Smoking status: Never Smoker   • Smokeless tobacco: Never Used   Substance and Sexual Activity   • Alcohol use: Yes     Alcohol/week: 7.0 oz     Types: 14 drink(s) per week     Comment: daily, 2 glasses of wine   • Drug use: No   • Sexual activity: Not on file   Lifestyle   • Physical activity     Days per week: Not on file     Minutes per session: Not on file   • Stress: Not on file   Relationships   • Social connections     Talks on phone: Not on file     Gets together: Not  on file     Attends Congregational service: Not on file     Active member of club or organization: Not on file     Attends meetings of clubs or organizations: Not on file     Relationship status: Not on file   • Intimate partner violence     Fear of current or ex partner: Not on file     Emotionally abused: Not on file     Physically abused: Not on file     Forced sexual activity: Not on file   Other Topics Concern   • Not on file   Social History Narrative   • Not on file         No intake or output data in the 24 hours ending 11/15/20 0909     Physical Exam  Body mass index is 31.27 kg/m².  /66   Pulse 74   Temp 35.8 °C (96.5 °F) (Temporal)   Resp 16   Wt 93.3 kg (205 lb 11 oz)   SpO2 92%   Vitals:    11/14/20 2348 11/15/20 0234 11/15/20 0402 11/15/20 0808   BP: 145/78 153/82 101/51 129/66   Pulse: 80 82 68 74   Resp: 18 17 18 16   Temp: 36.3 °C (97.3 °F) 36.4 °C (97.5 °F) 35.9 °C (96.6 °F) 35.8 °C (96.5 °F)   TempSrc: Tympanic Temporal Temporal Temporal   SpO2: 99% 91% 92% 92%   Weight:  93.3 kg (205 lb 11 oz)       Oxygen Therapy:  Pulse Oximetry: 92 %, O2 (LPM): 0, O2 Delivery Device: None - Room Air    General: Well appearing and in no apparent distress  Eyes: nl conjunctiva  ENT: OP clear  Neck: JVP not elevated, no carotid bruits  Lungs: normal respiratory effort, CTAB  Heart: RRR, no murmurs, no rubs or gallops, no edema bilateral lower extremities. No LV/RV heave on cardiac palpatation. 2+ bilateral radial pulses.  2+ bilateral dp pulses.   Abdomen: soft, non tender, non distended, no masses, normal bowel sounds.  No HSM.  Extremities/MSK: no clubbing, no cyanosis  Neurological: No focal sensory deficits  Psychiatric: Appropriate affect, alert to self  Skin: Warm extremities      Labs (personally reviewed and notable for):   Lab Results   Component Value Date/Time    SODIUM 140 11/14/2020 11:47 PM    POTASSIUM 3.9 11/14/2020 11:47 PM    CHLORIDE 108 11/14/2020 11:47 PM    CO2 21 11/14/2020 11:47 PM     GLUCOSE 102 (H) 11/14/2020 11:47 PM    BUN 28 (H) 11/14/2020 11:47 PM    CREATININE 1.10 11/14/2020 11:47 PM      Lab Results   Component Value Date/Time    WBC 11.8 (H) 11/14/2020 11:47 PM    RBC 4.62 (L) 11/14/2020 11:47 PM    HEMOGLOBIN 13.5 (L) 11/14/2020 11:47 PM    HEMATOCRIT 42.3 11/14/2020 11:47 PM    MCV 91.6 11/14/2020 11:47 PM    MCH 29.2 11/14/2020 11:47 PM    MCHC 31.9 (L) 11/14/2020 11:47 PM    MPV 11.7 11/14/2020 11:47 PM    NEUTSPOLYS 76.20 (H) 11/14/2020 11:47 PM    LYMPHOCYTES 9.50 (L) 11/14/2020 11:47 PM    MONOCYTES 9.60 11/14/2020 11:47 PM    EOSINOPHILS 3.80 11/14/2020 11:47 PM    BASOPHILS 0.30 11/14/2020 11:47 PM    HYPOCHROMIA 1+ 12/31/2012 11:02 AM    ANISOCYTOSIS 1+ 11/07/2012 03:30 PM      Lab Results   Component Value Date/Time    CHOLSTRLTOT 161 05/30/2018 10:00 AM    LDL 96 05/30/2018 10:00 AM    HDL 53 05/30/2018 10:00 AM    TRIGLYCERIDE 60 05/30/2018 10:00 AM       Lab Results   Component Value Date/Time    TROPONINT 209 (H) 11/15/2020 0517     Lab Results   Component Value Date/Time    NTPROBNP 258 (H) 09/07/2020 1441         Cardiac Imaging and Procedures Review:    EKG dated 11/14/2020: My personal interpretation is atrial placed rhythm with heart rate 82 bpm, diffuse T wave inversion concerning for ischemia.  Repeat ECG obtained this morning shows AV paced rhythm at heart rate 83 bpm, unable to do further analysis for ischemia.    Telemetry (last 24 hours): Normal sinus rhythm    Radiology test Review:  CXR 11/15/2020: No acute cardiopulmonary abnormality noted.      Impression and Medical Decision Making:  #NSTEMI  #CAD status post previous CABG  #Hypertension  #Underlying dementia    Recommendations:  --Patient currently has uptrending troponin with symptoms of chest pain that brought him to the hospital.  Hence, agree with the diagnosis of NSTEMI, however, based on discussion with the primary team patient's family is against any long-term antiplatelet or anticoagulant  therapy due to dementia with risk of fall.      --Called patient's family and had an extensive discussion with patient's son and daughter explaining current medical condition which is complicated with underlying dementia, risk of fall and the need to be on dual antiplatelet therapy for a minimum of 1 year if a stent is placed.  After discussion, patient's daughter and son wishes to pursue medical management only with no interventions at this time.    --Recommend medical management with aspirin 81 mg daily (family okay with it), heparin drip for 48 hours and initiation of Lipitor 40 mg daily.    --Patient also has paroxysmal A. fib/flutter and does need to be on oral anticoagulation for stroke prevention.  However, is not on anticoagulation per family's decision.      Thank you for allowing me to participate in the care of this patient.  Cardiology will sign off at this time.  Please contact me with any questions.    I spent 60 mins non face-to-face today reviewing patient's chart, previous admissions, calling family and discussing plan of care with the family and primary team. Start time 11 AM, stop time 12 PM.      Rolo Jean Baptiste M.D.  Cardiologist, [facility] [place]   [phone]    This note was generated using voice recognition software which has a small chance of producing errors of grammar and possibly content. I have made every reasonable attempt to find and correct any obvious errors, but expect that some may not be found prior to finalization of this note.

## 2020-11-15 NOTE — ASSESSMENT & PLAN NOTE
Presented with acute onset chest pressure, SOB. Was found to have elevated troponin of 27, now up to 1192. EKG dual chamber paced, no indication of ST elevation. Notes indicate family inclination towards minimizing interventions, no anticoagulation. Was given lovenox in ED acutely. Hemodynamically stable. Family was called morning of 11/15: sons demanding to talk to cardiologist, not hospitalist team. Family preference is to medical management opposed to intervention.  -ASA 81mg  -Atorvastatin 40mg  -Heparin gtt (will complete 11/17 @ 1pm)  -metoprolol 25mg BID  -lisinopril 2.5mg qD

## 2020-11-15 NOTE — PROGRESS NOTES
Received report from night shift RNHaroon. Assumed care of pt. Pt reports no pain at this time. Updated pt on plan of care. Pt resting comfortably upright in bed. Educated on use of call light. Hourly rounding and continuous monitoring in place.

## 2020-11-15 NOTE — ASSESSMENT & PLAN NOTE
History of CAD with CABG. Also has a dual chamber pacer, set at 75bpm. Currently undergoing evaluation for NSTEMI.

## 2020-11-16 LAB
ALBUMIN SERPL BCP-MCNC: 3.7 G/DL (ref 3.2–4.9)
ALBUMIN/GLOB SERPL: 1.4 G/DL
ALP SERPL-CCNC: 41 U/L (ref 30–99)
ALT SERPL-CCNC: 31 U/L (ref 2–50)
ANION GAP SERPL CALC-SCNC: 8 MMOL/L (ref 7–16)
AST SERPL-CCNC: 95 U/L (ref 12–45)
BASOPHILS # BLD AUTO: 0.3 % (ref 0–1.8)
BASOPHILS # BLD: 0.03 K/UL (ref 0–0.12)
BILIRUB SERPL-MCNC: 0.5 MG/DL (ref 0.1–1.5)
BUN SERPL-MCNC: 22 MG/DL (ref 8–22)
CALCIUM SERPL-MCNC: 9 MG/DL (ref 8.5–10.5)
CHLORIDE SERPL-SCNC: 105 MMOL/L (ref 96–112)
CO2 SERPL-SCNC: 23 MMOL/L (ref 20–33)
CREAT SERPL-MCNC: 1.12 MG/DL (ref 0.5–1.4)
EOSINOPHIL # BLD AUTO: 0.38 K/UL (ref 0–0.51)
EOSINOPHIL NFR BLD: 3.8 % (ref 0–6.9)
ERYTHROCYTE [DISTWIDTH] IN BLOOD BY AUTOMATED COUNT: 52.2 FL (ref 35.9–50)
GLOBULIN SER CALC-MCNC: 2.6 G/DL (ref 1.9–3.5)
GLUCOSE SERPL-MCNC: 100 MG/DL (ref 65–99)
HCT VFR BLD AUTO: 39.8 % (ref 42–52)
HGB BLD-MCNC: 12.8 G/DL (ref 14–18)
IMM GRANULOCYTES # BLD AUTO: 0.04 K/UL (ref 0–0.11)
IMM GRANULOCYTES NFR BLD AUTO: 0.4 % (ref 0–0.9)
LYMPHOCYTES # BLD AUTO: 1.03 K/UL (ref 1–4.8)
LYMPHOCYTES NFR BLD: 10.2 % (ref 22–41)
MAGNESIUM SERPL-MCNC: 1.9 MG/DL (ref 1.5–2.5)
MCH RBC QN AUTO: 29.6 PG (ref 27–33)
MCHC RBC AUTO-ENTMCNC: 32.2 G/DL (ref 33.7–35.3)
MCV RBC AUTO: 91.9 FL (ref 81.4–97.8)
MONOCYTES # BLD AUTO: 1.1 K/UL (ref 0–0.85)
MONOCYTES NFR BLD AUTO: 10.9 % (ref 0–13.4)
NEUTROPHILS # BLD AUTO: 7.52 K/UL (ref 1.82–7.42)
NEUTROPHILS NFR BLD: 74.4 % (ref 44–72)
NRBC # BLD AUTO: 0 K/UL
NRBC BLD-RTO: 0 /100 WBC
PHOSPHATE SERPL-MCNC: 2.9 MG/DL (ref 2.5–4.5)
PLATELET # BLD AUTO: 185 K/UL (ref 164–446)
PMV BLD AUTO: 12.7 FL (ref 9–12.9)
POTASSIUM SERPL-SCNC: 4 MMOL/L (ref 3.6–5.5)
PROT SERPL-MCNC: 6.3 G/DL (ref 6–8.2)
RBC # BLD AUTO: 4.33 M/UL (ref 4.7–6.1)
SODIUM SERPL-SCNC: 136 MMOL/L (ref 135–145)
UFH PPP CHRO-ACNC: 0.59 IU/ML
WBC # BLD AUTO: 10.1 K/UL (ref 4.8–10.8)

## 2020-11-16 PROCEDURE — A9270 NON-COVERED ITEM OR SERVICE: HCPCS | Performed by: INTERNAL MEDICINE

## 2020-11-16 PROCEDURE — 85025 COMPLETE CBC W/AUTO DIFF WBC: CPT

## 2020-11-16 PROCEDURE — 80053 COMPREHEN METABOLIC PANEL: CPT

## 2020-11-16 PROCEDURE — 770020 HCHG ROOM/CARE - TELE (206)

## 2020-11-16 PROCEDURE — 700102 HCHG RX REV CODE 250 W/ 637 OVERRIDE(OP): Performed by: INTERNAL MEDICINE

## 2020-11-16 PROCEDURE — 700102 HCHG RX REV CODE 250 W/ 637 OVERRIDE(OP): Performed by: STUDENT IN AN ORGANIZED HEALTH CARE EDUCATION/TRAINING PROGRAM

## 2020-11-16 PROCEDURE — 99233 SBSQ HOSP IP/OBS HIGH 50: CPT | Mod: GC | Performed by: INTERNAL MEDICINE

## 2020-11-16 PROCEDURE — 83735 ASSAY OF MAGNESIUM: CPT

## 2020-11-16 PROCEDURE — 85520 HEPARIN ASSAY: CPT

## 2020-11-16 PROCEDURE — 96366 THER/PROPH/DIAG IV INF ADDON: CPT

## 2020-11-16 PROCEDURE — A9270 NON-COVERED ITEM OR SERVICE: HCPCS | Performed by: STUDENT IN AN ORGANIZED HEALTH CARE EDUCATION/TRAINING PROGRAM

## 2020-11-16 PROCEDURE — 36415 COLL VENOUS BLD VENIPUNCTURE: CPT

## 2020-11-16 PROCEDURE — 84100 ASSAY OF PHOSPHORUS: CPT

## 2020-11-16 PROCEDURE — 700111 HCHG RX REV CODE 636 W/ 250 OVERRIDE (IP): Performed by: STUDENT IN AN ORGANIZED HEALTH CARE EDUCATION/TRAINING PROGRAM

## 2020-11-16 RX ORDER — LISINOPRIL 5 MG/1
2.5 TABLET ORAL
Status: DISCONTINUED | OUTPATIENT
Start: 2020-11-16 | End: 2020-11-17 | Stop reason: HOSPADM

## 2020-11-16 RX ADMIN — METOPROLOL TARTRATE 25 MG: 25 TABLET, FILM COATED ORAL at 17:33

## 2020-11-16 RX ADMIN — OMEPRAZOLE 20 MG: 20 CAPSULE, DELAYED RELEASE ORAL at 05:09

## 2020-11-16 RX ADMIN — ATORVASTATIN CALCIUM 40 MG: 40 TABLET, FILM COATED ORAL at 17:33

## 2020-11-16 RX ADMIN — HEPARIN SODIUM 12 UNITS/KG/HR: 5000 INJECTION, SOLUTION INTRAVENOUS at 19:11

## 2020-11-16 RX ADMIN — TAMSULOSIN HYDROCHLORIDE 0.4 MG: 0.4 CAPSULE ORAL at 08:31

## 2020-11-16 RX ADMIN — LISINOPRIL 2.5 MG: 5 TABLET ORAL at 08:31

## 2020-11-16 RX ADMIN — ASPIRIN 81 MG: 81 TABLET, COATED ORAL at 05:09

## 2020-11-16 RX ADMIN — METOPROLOL TARTRATE 25 MG: 25 TABLET, FILM COATED ORAL at 08:31

## 2020-11-16 ASSESSMENT — ENCOUNTER SYMPTOMS
DIZZINESS: 0
ABDOMINAL PAIN: 0
SHORTNESS OF BREATH: 0
NERVOUS/ANXIOUS: 0
NAUSEA: 0
COUGH: 0
MYALGIAS: 0
HEADACHES: 0
DEPRESSION: 0
PALPITATIONS: 0
VOMITING: 0

## 2020-11-16 ASSESSMENT — FIBROSIS 4 INDEX: FIB4 SCORE: 8.39

## 2020-11-16 NOTE — PROGRESS NOTES
Bedside report received from day RN, pt care assumed, assessment completed. Pt is A&O2 to self and some of situation, pain 0. Updated on POC, questions answered. Bed in lowest, locked position, treaded socks on, call light and belongings within reach. Fall precautions in place.

## 2020-11-16 NOTE — PROGRESS NOTES
Daily Progress Note:     Date of Service: 11/16/2020  Primary Team: [employer] [facility] Blue Team   Attending: David Isaacs M.D.   Senior Resident: Dr. Hawthorne  Intern: Dr. Drake  Contact:  [phone]    Chief Complaint:   Chest pain    Subjective:   NAOE. Paced at 77-80. Care plan discussed between family and cardiologist. Medical management, symptomatic control. No anticoagulation beyond initial treatment and ASA.  Today: Denies symptoms. Unsure why he is in hospital. After reminder regarding chest pain; denies CP/SOB, nausea/vomiting.    Consultants/Specialty:  Cardiology    Review of Systems:    Review of Systems   Respiratory: Negative for cough and shortness of breath.    Cardiovascular: Negative for chest pain, palpitations and leg swelling.   Gastrointestinal: Negative for abdominal pain, nausea and vomiting.   Musculoskeletal: Negative for joint pain and myalgias.   Neurological: Negative for dizziness and headaches.   Psychiatric/Behavioral: Negative for depression. The patient is not nervous/anxious.        Objective Data:   Physical Exam:   Vitals:   Temp:  [36.1 °C (96.9 °F)-36.8 °C (98.2 °F)] 36.8 °C (98.2 °F)  Pulse:  [76-85] 83  Resp:  [16-18] 16  BP: (135-148)/(65-86) 135/83  SpO2:  [91 %-94 %] 94 %     Physical Exam  Vitals signs and nursing note reviewed.   Constitutional:       General: He is not in acute distress.     Appearance: He is not ill-appearing or diaphoretic.      Comments: Very pleasant. Excited to tell stories and jokes.   HENT:      Head: Normocephalic and atraumatic.      Mouth/Throat:      Mouth: Mucous membranes are moist.      Pharynx: Oropharynx is clear.   Eyes:      Extraocular Movements: Extraocular movements intact.      Pupils: Pupils are equal, round, and reactive to light.   Cardiovascular:      Rate and Rhythm: Normal rate and regular rhythm.      Heart sounds: No murmur. No friction rub. No gallop.    Pulmonary:      Effort: Pulmonary effort is normal.      Breath  sounds: Normal breath sounds.   Abdominal:      General: There is no distension.      Palpations: Abdomen is soft.      Tenderness: There is no abdominal tenderness. There is no guarding or rebound.   Musculoskeletal: Normal range of motion.      Comments: Ambulatory to chair, shoulder/elbow flexion/extension 5/5 bilaterally.   Skin:     General: Skin is warm and dry.   Neurological:      Mental Status: He is alert.      Comments: Oriented to self, place ([facility] [place])   Psychiatric:         Mood and Affect: Mood normal.         Behavior: Behavior normal.         Thought Content: Thought content normal.           Labs:   Lab Results   Component Value Date/Time    SODIUM 136 11/16/2020 03:51 AM    POTASSIUM 4.0 11/16/2020 03:51 AM    CHLORIDE 105 11/16/2020 03:51 AM    CO2 23 11/16/2020 03:51 AM    GLUCOSE 100 (H) 11/16/2020 03:51 AM    BUN 22 11/16/2020 03:51 AM    CREATININE 1.12 11/16/2020 03:51 AM      Recent Labs     11/14/20  2347 11/16/20  0351   WBC 11.8* 10.1   RBC 4.62* 4.33*   HEMOGLOBIN 13.5* 12.8*   HEMATOCRIT 42.3 39.8*   MCV 91.6 91.9   MCH 29.2 29.6   RDW 52.9* 52.2*   PLATELETCT 221 185   MPV 11.7 12.7   NEUTSPOLYS 76.20* 74.40*   LYMPHOCYTES 9.50* 10.20*   MONOCYTES 9.60 10.90   EOSINOPHILS 3.80 3.80   BASOPHILS 0.30 0.30       Imaging:     Anthony Coleman is a 89yo M with hx of CAD (s/p CABG), CKD3, HTN, HLD, GERD; presenting for awakening chest pain radiating into L arm/axilla; found to have elevated troponin without EKG changes (paced); undergoing medication management for NSTEMI.    * NSTEMI (non-ST elevated myocardial infarction) (HCC)  Assessment & Plan  Presented with acute onset chest pressure, SOB. Was found to have elevated troponin of 27, now up to 1192. EKG dual chamber paced, no indication of ST elevation. Notes indicate family inclination towards minimizing interventions, no anticoagulation. Was given lovenox in ED acutely. Hemodynamically stable. Family was called morning of 11/15:  sons demanding to talk to cardiologist, not hospitalist team. Family preference is to medical management opposed to intervention.  -ASA 81mg  -Atorvastatin 40mg  -Heparin gtt (will complete 11/17 @ 1pm)  -metoprolol 25mg BID  -lisinopril 2.5mg qD    Hx of CABG  Assessment & Plan  History of CAD with CABG. Also has a dual chamber pacer, set at 75bpm. Currently undergoing evaluation for NSTEMI.    Gastroesophageal reflux disease without esophagitis- (present on admission)  Assessment & Plan  Hx of GERD. No reported symptoms during admission.  -omeprazole 20mg PO qD    CKD (chronic kidney disease) stage 3, GFR 30-59 ml/min- (present on admission)  Assessment & Plan  Renal function appears to be at baseline, creatinine 1.1.  -repeat BMP in AM for signs exacerbation    Hypertension- (present on admission)  Assessment & Plan  Hx of hypertension. BP has been stable on admission. SBP goal is >140, 120 if tolerated without orthostatic hypotension  -lisinopril 2.5mg PO qD

## 2020-11-17 ENCOUNTER — APPOINTMENT (OUTPATIENT)
Dept: PHYSICAL THERAPY | Facility: REHABILITATION | Age: 85
End: 2020-11-17
Payer: MEDICARE

## 2020-11-17 VITALS
SYSTOLIC BLOOD PRESSURE: 100 MMHG | HEIGHT: 68 IN | TEMPERATURE: 97.9 F | OXYGEN SATURATION: 92 % | RESPIRATION RATE: 18 BRPM | BODY MASS INDEX: 30.97 KG/M2 | DIASTOLIC BLOOD PRESSURE: 61 MMHG | WEIGHT: 204.37 LBS | HEART RATE: 86 BPM

## 2020-11-17 LAB
ALBUMIN SERPL BCP-MCNC: 3.7 G/DL (ref 3.2–4.9)
ALBUMIN/GLOB SERPL: 1.3 G/DL
ALP SERPL-CCNC: 43 U/L (ref 30–99)
ALT SERPL-CCNC: 29 U/L (ref 2–50)
ANION GAP SERPL CALC-SCNC: 8 MMOL/L (ref 7–16)
AST SERPL-CCNC: 48 U/L (ref 12–45)
BASOPHILS # BLD AUTO: 0.5 % (ref 0–1.8)
BASOPHILS # BLD: 0.05 K/UL (ref 0–0.12)
BILIRUB SERPL-MCNC: 0.6 MG/DL (ref 0.1–1.5)
BUN SERPL-MCNC: 22 MG/DL (ref 8–22)
CALCIUM SERPL-MCNC: 9.3 MG/DL (ref 8.5–10.5)
CHLORIDE SERPL-SCNC: 104 MMOL/L (ref 96–112)
CO2 SERPL-SCNC: 26 MMOL/L (ref 20–33)
CREAT SERPL-MCNC: 1.3 MG/DL (ref 0.5–1.4)
EOSINOPHIL # BLD AUTO: 0.33 K/UL (ref 0–0.51)
EOSINOPHIL NFR BLD: 3.4 % (ref 0–6.9)
ERYTHROCYTE [DISTWIDTH] IN BLOOD BY AUTOMATED COUNT: 54.1 FL (ref 35.9–50)
GLOBULIN SER CALC-MCNC: 2.9 G/DL (ref 1.9–3.5)
GLUCOSE SERPL-MCNC: 95 MG/DL (ref 65–99)
HCT VFR BLD AUTO: 40.8 % (ref 42–52)
HGB BLD-MCNC: 13 G/DL (ref 14–18)
IMM GRANULOCYTES # BLD AUTO: 0.05 K/UL (ref 0–0.11)
IMM GRANULOCYTES NFR BLD AUTO: 0.5 % (ref 0–0.9)
LYMPHOCYTES # BLD AUTO: 1.09 K/UL (ref 1–4.8)
LYMPHOCYTES NFR BLD: 11.2 % (ref 22–41)
MCH RBC QN AUTO: 29.6 PG (ref 27–33)
MCHC RBC AUTO-ENTMCNC: 31.9 G/DL (ref 33.7–35.3)
MCV RBC AUTO: 92.9 FL (ref 81.4–97.8)
MONOCYTES # BLD AUTO: 1.19 K/UL (ref 0–0.85)
MONOCYTES NFR BLD AUTO: 12.2 % (ref 0–13.4)
NEUTROPHILS # BLD AUTO: 7.03 K/UL (ref 1.82–7.42)
NEUTROPHILS NFR BLD: 72.2 % (ref 44–72)
NRBC # BLD AUTO: 0 K/UL
NRBC BLD-RTO: 0 /100 WBC
PLATELET # BLD AUTO: 194 K/UL (ref 164–446)
PMV BLD AUTO: 12.7 FL (ref 9–12.9)
POTASSIUM SERPL-SCNC: 4.4 MMOL/L (ref 3.6–5.5)
PROT SERPL-MCNC: 6.6 G/DL (ref 6–8.2)
RBC # BLD AUTO: 4.39 M/UL (ref 4.7–6.1)
SODIUM SERPL-SCNC: 138 MMOL/L (ref 135–145)
UFH PPP CHRO-ACNC: 0.35 IU/ML
WBC # BLD AUTO: 9.7 K/UL (ref 4.8–10.8)

## 2020-11-17 PROCEDURE — 700102 HCHG RX REV CODE 250 W/ 637 OVERRIDE(OP): Performed by: INTERNAL MEDICINE

## 2020-11-17 PROCEDURE — A9270 NON-COVERED ITEM OR SERVICE: HCPCS | Performed by: STUDENT IN AN ORGANIZED HEALTH CARE EDUCATION/TRAINING PROGRAM

## 2020-11-17 PROCEDURE — 85025 COMPLETE CBC W/AUTO DIFF WBC: CPT

## 2020-11-17 PROCEDURE — 36415 COLL VENOUS BLD VENIPUNCTURE: CPT

## 2020-11-17 PROCEDURE — 80053 COMPREHEN METABOLIC PANEL: CPT

## 2020-11-17 PROCEDURE — 99239 HOSP IP/OBS DSCHRG MGMT >30: CPT | Mod: GC | Performed by: INTERNAL MEDICINE

## 2020-11-17 PROCEDURE — 700102 HCHG RX REV CODE 250 W/ 637 OVERRIDE(OP): Performed by: STUDENT IN AN ORGANIZED HEALTH CARE EDUCATION/TRAINING PROGRAM

## 2020-11-17 PROCEDURE — A9270 NON-COVERED ITEM OR SERVICE: HCPCS | Performed by: INTERNAL MEDICINE

## 2020-11-17 PROCEDURE — 85520 HEPARIN ASSAY: CPT

## 2020-11-17 RX ORDER — LISINOPRIL 2.5 MG/1
2.5 TABLET ORAL DAILY
Qty: 30 TAB | Refills: 0 | Status: SHIPPED | OUTPATIENT
Start: 2020-11-18 | End: 2020-12-11 | Stop reason: SDUPTHER

## 2020-11-17 RX ORDER — ASPIRIN 81 MG/1
81 TABLET ORAL DAILY
Qty: 30 TAB | Refills: 0 | Status: SHIPPED | OUTPATIENT
Start: 2020-11-18 | End: 2020-12-11 | Stop reason: SDUPTHER

## 2020-11-17 RX ORDER — METOPROLOL SUCCINATE 50 MG/1
50 TABLET, EXTENDED RELEASE ORAL DAILY
Qty: 30 TAB | Refills: 0 | Status: SHIPPED | OUTPATIENT
Start: 2020-11-17 | End: 2020-12-11 | Stop reason: SDUPTHER

## 2020-11-17 RX ORDER — ATORVASTATIN CALCIUM 40 MG/1
40 TABLET, FILM COATED ORAL EVERY EVENING
Qty: 30 TAB | Refills: 0 | Status: SHIPPED | OUTPATIENT
Start: 2020-11-17 | End: 2020-12-17

## 2020-11-17 RX ADMIN — METOPROLOL TARTRATE 25 MG: 25 TABLET, FILM COATED ORAL at 04:58

## 2020-11-17 RX ADMIN — ACETAMINOPHEN 650 MG: 325 TABLET, FILM COATED ORAL at 08:14

## 2020-11-17 RX ADMIN — TAMSULOSIN HYDROCHLORIDE 0.4 MG: 0.4 CAPSULE ORAL at 08:14

## 2020-11-17 RX ADMIN — LISINOPRIL 2.5 MG: 5 TABLET ORAL at 04:58

## 2020-11-17 RX ADMIN — OMEPRAZOLE 20 MG: 20 CAPSULE, DELAYED RELEASE ORAL at 04:58

## 2020-11-17 RX ADMIN — ASPIRIN 81 MG: 81 TABLET, COATED ORAL at 04:58

## 2020-11-17 NOTE — DISCHARGE SUMMARY
Discharge Summary    Date of Admission: 11/14/2020  Date of Discharge: 11/17/2020.  Discharging Attending: David Isaacs M.D.   Discharging Senior Resident: Dr. Chika SHAIKH  Discharging Intern: Dr. Noelle SHAIKH    CHIEF COMPLAINT ON ADMISSION  Chief Complaint   Patient presents with   • Chest Pain       Reason for Admission  NSTEMI    Admission Date  11/14/2020    CODE STATUS  DNAR/DNI    HPI & HOSPITAL COURSE  This is a 89 y.o. male here with known history of coronary artery disease with previous CABG, paroxysmal atrial fibrillation/flutter not on anticoagulation presented with complaints of chest pain along with elevated troponins suggestive of NSTEMI.  Patient was admitted for further evaluation, cardiology was consulted, after discussion with patient's family-patient's family declined any antiplatelet or anticoagulation therapy due to dementia and risk of fall, hence, no interventions like left heart cath was planned as it would not be beneficial given that patient will not be on dual antiplatelet or anticoagulation and opted for medical management.  Patient's family agreed to start aspirin 81 mg daily, heparin for 48 hours, statin, lisinopril 2.5 mg daily and metoprolol 25 mg twice daily.  Patient tolerated medications well, denied any chest pain or shortness of breath.  Hence, patient will be discharged in medically stable condition with recommended outpatient follow-up with cardiology and primary care physician.       Therefore, he is discharged in fair and stable condition to home with close outpatient follow-up.    The patient met 2-midnight criteria for an inpatient stay at the time of discharge.    PHYSICAL EXAM ON DISCHARGE  Temp:  [36.1 °C (97 °F)-36.6 °C (97.9 °F)] 36.6 °C (97.9 °F)  Pulse:  [66-86] 86  Resp:  [16-18] 18  BP: (100-155)/(59-87) 100/61  SpO2:  [92 %-96 %] 92 %    Physical Exam     Constitutional:       General: He is not in acute distress.     Appearance: He is not ill-appearing or  diaphoretic.      Comments: Very pleasant. Excited to tell stories and jokes.   HENT:      Head: Normocephalic and atraumatic.      Mouth/Throat:      Mouth: Mucous membranes are moist.      Pharynx: Oropharynx is clear.   Eyes:      Extraocular Movements: Extraocular movements intact.      Pupils: Pupils are equal, round, and reactive to light.   Cardiovascular:      Rate and Rhythm: Normal rate and regular rhythm.      Heart sounds: No murmur. No friction rub. No gallop.    Pulmonary:      Effort: Pulmonary effort is normal.      Breath sounds: Normal breath sounds.   Abdominal:      General: There is no distension.      Palpations: Abdomen is soft.      Tenderness: There is no abdominal tenderness. There is no guarding or rebound.   Musculoskeletal: Normal range of motion.      Comments: Ambulatory to chair, shoulder/elbow flexion/extension 5/5 bilaterally.   Skin:     General: Skin is warm and dry.   Neurological:      Mental Status: He is alert.      Comments: Oriented to self, place ([facility], [place])   Psychiatric:         Mood and Affect: Mood normal.         Behavior: Behavior normal.         Thought Content: Thought content normal.      Discharge Date  11/17/20    FOLLOW UP ITEMS POST DISCHARGE  Follow up with PCP  Follow up with cardiology for medical management.    DISCHARGE DIAGNOSES  Principal Problem:    NSTEMI (non-ST elevated myocardial infarction) (HCC) POA: Unknown  Active Problems:    Hypertension POA: Yes    CKD (chronic kidney disease) stage 3, GFR 30-59 ml/min POA: Yes    Gastroesophageal reflux disease without esophagitis POA: Yes    Hx of CABG POA: Unknown  Resolved Problems:    * No resolved hospital problems. *      FOLLOW UP  No future appointments.  Phil Mcgee M.D.  [address] [facility]  [address]  [facility] [id number] [phone]    In 1 week  For follow up NSTEMI; family requested no intervention/anticoagulation      MEDICATIONS ON DISCHARGE     Medication List      START taking these  medications      Instructions   aspirin 81 MG EC tablet  Start taking on: November 18, 2020   Take 1 Tab by mouth every day.  Dose: 81 mg     atorvastatin 40 MG Tabs  Commonly known as: LIPITOR   Take 1 Tab by mouth every evening.  Dose: 40 mg     lisinopril 2.5 MG Tabs  Start taking on: November 18, 2020  Commonly known as: PRINIVIL   Take 1 Tab by mouth every day.  Dose: 2.5 mg     metoprolol SR 50 MG Tb24  Commonly known as: TOPROL XL   Take 1 Tab by mouth every day.  Dose: 50 mg        CONTINUE taking these medications      Instructions   escitalopram 20 MG tablet  Commonly known as: LEXAPRO   Take 1 Tab by mouth every day.  Dose: 20 mg     esomeprazole 20 MG capsule  Commonly known as: NEXIUM   TAKE 1 CAP BY MOUTH EVERY MORNING BEFORE BREAKFAST.  Dose: 20 mg     meclizine 25 MG Tabs  Commonly known as: ANTIVERT   Take 1 Tab by mouth 3 times a day as needed.  Dose: 25 mg     tamsulosin 0.4 MG capsule  Commonly known as: FLOMAX   TAKE 1 CAP BY MOUTH ONE-HALF HOUR AFTER BREAKFAST.  Dose: 0.4 mg            Allergies  Allergies   Allergen Reactions   • Pcn [Penicillins] Itching   • Sulfa Drugs Itching       DIET  Orders Placed This Encounter   Procedures   • Diet Order Diet: Cardiac     Standing Status:   Standing     Number of Occurrences:   1     Order Specific Question:   Diet:     Answer:   Cardiac [6]       ACTIVITY  As tolerated.  Weight bearing as tolerated    CONSULTATIONS  Dr. Jean Baptiste with Cardiology consulted.  Treatment options were discussed and plan of care agreed upon.    PROCEDURES  None

## 2020-11-17 NOTE — PROGRESS NOTES
Assumed care of patient at bedside report from dayshift RN. Updated on POC. Patient currently A & O x 1; on RA; up standby assist; without complaints of acute pain. Call light within reach. Whiteboard updated. Fall precautions in place. Bed locked and in lowest position. All questions answered. No other needs indicated at this time.

## 2020-11-17 NOTE — PROGRESS NOTES
Assumed care of patient. Per report, patient continued to be impulsive and a high fall risk overnight. Lap belt in place, patient efficient at removing it. Educated patient to call for assistance with no evidence of learning. Will continue to monitor.

## 2020-11-17 NOTE — PROGRESS NOTES
Pt dc'd in stable and satisfactory fashion. IV and monitor removed; monitor room notified. Pt left unit via wheelchair with CNA. Personal belongings with pt when leaving unit. Pt's caregiver given discharge instructions prior to leaving unit including where to  prescriptions and when to follow-up; verbalizes understanding. Copy of discharge instructions with pt's caregiver and in the chart.

## 2020-11-17 NOTE — DISCHARGE INSTRUCTIONS
Discharge Instructions    Discharged to home by car with escort. Discharged via wheelchair, hospital escort: Yes.  Special equipment needed: Not Applicable    Be sure to schedule a follow-up appointment with your primary care doctor or any specialists as instructed.     Discharge Plan:   Diet Plan: Discussed  Activity Level: Discussed  Confirmed Follow up Appointment: Patient to Call and Schedule Appointment  Confirmed Symptoms Management: Discussed  Medication Reconciliation Updated: Yes  Influenza Vaccine Indication: Not indicated: Previously immunized this influenza season and > 8 years of age    I understand that a diet low in cholesterol, fat, and sodium is recommended for good health. Unless I have been given specific instructions below for another diet, I accept this instruction as my diet prescription.   Other diet: Heart Healthy      · Is patient discharged on Warfarin / Coumadin?   No     Depression / Suicide Risk    As you are discharged from this [facility] facility, it is important to learn how to keep safe from harming yourself.    Recognize the warning signs:  · Abrupt changes in personality, positive or negative- including increase in energy   · Giving away possessions  · Change in eating patterns- significant weight changes-  positive or negative  · Change in sleeping patterns- unable to sleep or sleeping all the time   · Unwillingness or inability to communicate  · Depression  · Unusual sadness, discouragement and loneliness  · Talk of wanting to die  · Neglect of personal appearance   · Rebelliousness- reckless behavior  · Withdrawal from people/activities they love  · Confusion- inability to concentrate     If you or a loved one observes any of these behaviors or has concerns about self-harm, here's what you can do:  · Talk about it- your feelings and reasons for harming yourself  · Remove any means that you might use to hurt yourself (examples: pills, rope, extension cords, firearm)  · Get  professional help from the community (Mental Health, Substance Abuse, psychological counseling)  · Do not be alone:Call your Safe Contact- someone whom you trust who will be there for you.  · Call your local CRISIS HOTLINE 783-6940 or 509-123-0432  · Call your local Children's Mobile Crisis Response Team [place] (173) 830-2559 or www.[employer]  · Call the toll free National Suicide Prevention Hotlines   · National Suicide Prevention Lifeline 717-323-YQTN (5908)  · [employer] 800-SUICIDE (185-7836)        Heart Attack  The heart is a muscle that needs oxygen to survive. A heart attack is a condition that occurs when your heart does not get enough oxygen. When this happens, the heart muscle begins to die. This can cause permanent damage if not treated right away. A heart attack is a medical emergency.  This condition may be called a myocardial infarction, or MI. It is also known as acute coronary syndrome (ACS). ACS is a term used to describe a group of conditions that affect blood flow to the heart.  What are the causes?  This condition may be caused by:  · Atherosclerosis. This occurs when a fatty substance called plaque builds up in the arteries and blocks or reduces blood supply to the heart.  · A blood clot. A blood clot can develop suddenly when plaque breaks up within an artery and blocks blood flow to the heart.  · Low blood pressure.  · An abnormal heartbeat (arrhythmia).  · Conditions that cause a decrease of oxygen to the heart, such as anemiaorrespiratory failure.  · A spasm, or severe tightening, of a blood vessel that cuts off blood flow to the heart.  · Tearing of a coronary artery (spontaneous coronary artery dissection).  · High blood pressure.  What increases the risk?  The following factors may make you more likely to develop this condition:  · Aging. The older you are, the higher your risk.  · Having a personal or family history of chest pain, heart attack, stroke, or  narrowing of the arteries in the legs, arms, head, or stomach (peripheral artery disease).  · Being male.  · Smoking.  · Not getting regular exercise.  · Being overweight or obese.  · Having high blood pressure.  · Having high cholesterol (hypercholesterolemia).  · Having diabetes.  · Drinking too much alcohol.  · Using illegal drugs, such as cocaine or methamphetamine.  What are the signs or symptoms?  Symptoms of this condition may vary, depending on factors like gender and age. Symptoms may include:  · Chest pain. It may feel like:  ? Crushing or squeezing.  ? Tightness, pressure, fullness, or heaviness.  · Pain in the arm, neck, jaw, back, or upper body.  · Shortness of breath.  · Heartburn or upset stomach.  · Nausea.  · Sudden cold sweats.  · Feeling tired.  · Sudden light-headedness.  How is this diagnosed?  This condition may be diagnosed through tests, such as:  · Electrocardiogram (ECG) to measure the electrical activity of your heart.  · Blood tests to check for cardiac markers. These chemicals are released by a damaged heart muscle.  · A test to evaluate blood flow and heart function (coronary angiogram).  · CT scan to see the heart more clearly.  · A test to evaluate the pumping action of the heart (echocardiogram).  How is this treated?  A heart attack must be treated as soon as possible. Treatment may include:  · Medicines to:  ? Break up or dissolve blood clots (fibrinolytic therapy).  ? Thin blood and help prevent blood clots.  ? Treat blood pressure.  ? Improve blood flow to the heart.  ? Reduce pain.  ? Reduce cholesterol.  · Angioplasty and stent placement. These are procedures to widen a blocked artery and keep it open.  · Coronary artery bypass graft, CABG, or open heart surgery. This enables blood to flow to the heart by going around the blocked part of the artery.  · Oxygen therapy if needed.  · Cardiac rehabilitation. This improves your health and well-being through exercise, education, and  counseling.  Follow these instructions at home:  Medicines  · Take over-the-counter and prescription medicines only as told by your health care provider.  · Do not take the following medicines unless your health care provider says it is okay to take them:  ? NSAIDs, such as ibuprofen.  ? Supplements that contain vitamin A, vitamin E, or both.  ? Hormone replacement therapy that contains estrogen with or without progestin.  Lifestyle    · Do not use any products that contain nicotine or tobacco, such as cigarettes, e-cigarettes, and chewing tobacco. If you need help quitting, ask your health care provider.  · Avoid secondhand smoke.  · Exercise regularly. Ask your health care provider about participating in a cardiac rehabilitation program that helps you start exercising safely after a heart attack.  · Eat a heart-healthy diet. Your health care provider will tell you what foods to eat.  · Maintain a healthy weight.  · Learn ways to manage stress.  · Do not use illegal drugs.  Alcohol use  · Do not drink alcohol if:  ? Your health care provider tells you not to drink.  ? You are pregnant, may be pregnant, or are planning to become pregnant.  · If you drink alcohol:  ? Limit how much you use to:  § 0-1 drink a day for women.  § 0-2 drinks a day for men.  ? Be aware of how much alcohol is in your drink. In the [place] one drink equals one 12 oz bottle of beer (355 mL), one 5 oz glass of wine (148 mL), or one 1½ oz glass of hard liquor (44 mL).  General instructions  · Work with your health care provider to manage any other conditions you have, such as high blood pressure or diabetes. These conditions affect your heart.  · Get screened for depression, and seek treatment if needed.  · Keep your vaccinations up to date. Get the flu vaccine every year.  · Keep all follow-up visits as told by your health care provider. This is important.  Contact a health care provider if:  · You feel overwhelmed or sad.  · You have trouble  doing your daily activities.  Get help right away if:  · You have sudden, unexplained discomfort in your chest, arms, back, neck, jaw, or upper body.  · You have shortness of breath.  · You suddenly start to sweat or your skin gets clammy.  · You feel nauseous or you vomit.  · You have unexplained tiredness or weakness.  · You suddenly feel light-headed or dizzy.  · You notice your heart starts to beat fast or feels like it is skipping beats.  · You have blood pressure that is higher than 180/120.  These symptoms may represent a serious problem that is an emergency. Do not wait to see if the symptoms will go away. Get medical help right away. Call your local emergency services (911 in the [place] Do not drive yourself to the hospital.  Summary  · A heart attack, also called myocardial infarction, is a condition that occurs when your heart does not get enough oxygen. This is caused by anything that blocks or reduces blood flow to the heart.  · Treatment is a combination of medicines and surgeries, if needed, to open the blocked arteries and restore blood flow to the heart.  · A heart attack is an emergency. Get help right away if you have sudden discomfort in your chest, arms, back, neck, jaw, or upper body. Seek help if you feel nauseous, you vomit, or you feel light-headed or dizzy.  This information is not intended to replace advice given to you by your health care provider. Make sure you discuss any questions you have with your health care provider.  Document Released: 12/18/2006 Document Revised: 03/26/2020 Document Reviewed: 03/30/2020  Elsevier Patient Education © 2020 [employer]

## 2020-11-17 NOTE — PROGRESS NOTES
[facility] [place] [facility] ATTENDING NOTE:   David Isaacs MD      Visit Time:   Attending/resident bedside rounds 9-11:30 AM     PATIENT ID  Name:             Anthony Coleman   YOB: 1929  Age:                 89 y.o.  male   MRN:               [id number]  Admit:  11/14/2020     I saw and examined the patient and discussed the management with the resident staff.  I reviewed the resident's note and agree with the resident's findings and plan as documented in the resident's note except as documented in the attending note. Please reference resident daily note for complete information.    The chart was reviewed and summarized.  Available labs, imaging, O2 sats ,  EKGs were reviewed. Available nursing, consultant, and resident notes were reviewed. I am actively involved in the patient's care.                                                                            ______________________________________________________________________            89(  admit Nov 14th , chest pain   )  INTERVAL:  Chart reviewed/summarized,   DC time 45 minutes     Nov 17AM:  AF< H R76, , 95% RA  WBC 9.7, HB 13, , CR 1.30 (1.12), AST 48 (90), K 4.4 ,   NURSING: paced, HR 75  neuro stable, no HF, no chest pain, ambulatory, has good family support, medical mangement only -- UFH finished, DC , OK for DC with family if stable      Nov 16AM: AF, hr 84, , 94% RA  ECHO: pending , PT, OT, has good family support   alert, no HF, no SOB, no edema, no GI/ s/s , denies angina, neuro status stable,   HR suboptimal, BP stable, no bleeding on UFH, GFR improved (change to enoxaparin), goals of care --> medical management only   wbc 10 (12), hb 12.8 (13.5), ptl 185   Na 136, K 4, Co2 23, , cR 1.12, AST 95         Nov 15AM: AF, H R85, , 94% RA  MEDS: ASA, statin, UFH FD, PPI      CORE:  Code Status (   DNR/DNI  --------------------------------------------------------------------------------------------------  Hospital Summary/ Patient System Review      NP:   *admit(  AO2, short term memory loss , retired opth  Impression: cognitive dysfunction chronic   , mood disorder by hx (no meds) , stable      2011: MRI: age appropriate atrophy, SVID      EENT:   *admit(       MSK/PAIN:   *admit(  ROM negative   Impression: hx bilateral hip prostheses, stable   Impression; hx sarcoma ?  (no documenation)      CVS:   *admit( hx CABG,  hx dual chamber pacer , hx CAD, HTN, DLD , aortic valve replacement (homograft? )  ACUTE: SOB,  EKG: no changes, chest pain Left arm, trop 27 --> 1192 ,  Heparin Xa 0.5, ddimer 2.45  Trop 27 --> 61 --> 209 --> 1192   cardiology: per family only symptomatic medical management, no invasive procedures   Impression: CAD (HX cabg), no complications  , acute presumed nonSTEMI  --> ASA, Plavix, statin, UFH  full dose (complete) , BBL, HR/ BP , volume control , K/MG repair  Impression: chronic HTN - stable   Impression: cardiac pacer, aortic valve prosthesis on imaging  (presumed homograft, not on anticoagulation)      PULM:   *admit(  pCXR: negative   NOV 15: CTAPE:  no PE , minor discord atelectasis, lower lobes, no effusion, LEFT HEPATIC LOB CYST (slightly larger then previous) , atrophic LEFT KIDNEY, aortic valve replacement      GI:   *admit(  GERD  ACUTE:  AST 13, ALT 14, ALP 45,  BR 0.3, ALB 3.8  Impression: GERD -- PPI, STABLE   Impression: hepatic cyst, stable, calcified retroperitoneal lesion stable  Impression: AST 95 (post MI? )   - Improving, f/u PCP      2018: CT abd with:   1.3cm mass left periphery fat calcified  no progression? , sigmoid diverticulosis   2016::: atrophic LK, renal stoens, RK: 2cm angiomyolipoma, ASVD, bilateral hip prostheses, colonic diverticulosis, sigmoid colitis ? , DJD spine, left sided rib fx (old)  1.3cm left retroperitoneal nodule calcification (no change from  2013)      :   *admit(  BPH? /Flomax   Impression: BPH/Flomax, stable     RENAL:   *admit(  CKD, ACUTE: Na 140, K 3.9, CO2 21, Bs 102, BUN 28, C R1.10, CA 8.6, CT abdomen: atrophic LK   Impression: CKD, atrophic LK (imaging, seen 2016 )  -- ACE/ARB if tolerated (Lisinopril 2.5 QD)      2018 : microALB low      HEME:   *admit(  WBC 12, HB 13.5,  , INR 1.04      ENDO:   *admit(  BS 100s   Impression: age related  (presumed) hypogonadism     2017: total testosterone 103Low     DERM/BREAST:   *admit(       ID:   *admit(  COIVD Nov 15 neg

## 2020-11-17 NOTE — PROGRESS NOTES
Pt frequently jumping out of bed despite education and reinforcement. Pt is a high risk for falls. Lap belt applied and pt can demonstrate removal.

## 2020-11-24 ENCOUNTER — OFFICE VISIT (OUTPATIENT)
Dept: INTERNAL MEDICINE | Facility: IMAGING CENTER | Age: 85
End: 2020-11-24
Payer: MEDICARE

## 2020-11-24 VITALS
DIASTOLIC BLOOD PRESSURE: 70 MMHG | OXYGEN SATURATION: 97 % | HEART RATE: 75 BPM | SYSTOLIC BLOOD PRESSURE: 128 MMHG | RESPIRATION RATE: 16 BRPM | TEMPERATURE: 97.6 F

## 2020-11-24 DIAGNOSIS — F02.80 LATE ONSET ALZHEIMER'S DISEASE WITHOUT BEHAVIORAL DISTURBANCE (HCC): ICD-10-CM

## 2020-11-24 DIAGNOSIS — G30.1 LATE ONSET ALZHEIMER'S DISEASE WITHOUT BEHAVIORAL DISTURBANCE (HCC): ICD-10-CM

## 2020-11-24 DIAGNOSIS — H81.13 BENIGN PAROXYSMAL POSITIONAL VERTIGO DUE TO BILATERAL VESTIBULAR DISORDER: ICD-10-CM

## 2020-11-24 DIAGNOSIS — I10 ESSENTIAL HYPERTENSION: ICD-10-CM

## 2020-11-24 DIAGNOSIS — I21.4 NSTEMI (NON-ST ELEVATED MYOCARDIAL INFARCTION) (HCC): ICD-10-CM

## 2020-11-24 DIAGNOSIS — Z95.0 PRESENCE OF PERMANENT CARDIAC PACEMAKER: ICD-10-CM

## 2020-11-24 DIAGNOSIS — I25.810 CORONARY ARTERY DISEASE INVOLVING CORONARY BYPASS GRAFT OF NATIVE HEART WITHOUT ANGINA PECTORIS: ICD-10-CM

## 2020-11-24 PROCEDURE — 99214 OFFICE O/P EST MOD 30 MIN: CPT | Performed by: INTERNAL MEDICINE

## 2020-11-24 RX ORDER — NITROGLYCERIN 0.3 MG/1
TABLET SUBLINGUAL
Qty: 100 TAB | Refills: 1 | Status: SHIPPED | OUTPATIENT
Start: 2020-11-24

## 2020-11-24 NOTE — PROGRESS NOTES
Chief Complaint   Patient presents with   • Hospital Follow-up   • Heart Problem       HISTORY OF THE PRESENT ILLNESS: Patient is a 89 y.o. male.     Patient comes in for follow-up after hospitalization on November 14 for non-ST elevation acute myocardial infarction.  He has a history of bypass.  He has a history of TAVR and pacemaker.  He had positive troponins.  He presented to the hospital complaining of left-sided chest pain, near syncope and nausea.  Was recommended that he undergo medical treatment only.  He is now chest pain-free.  He has been walking around the block with his caretaker.  No angina or equivalent.  He has advanced dementia.    He was also found to have atrial fibrillation during the hospitalization.  He has a pacemaker interrogation.  Permanent pacemaker    Allergies: Pcn [penicillins] and Sulfa drugs    Current Outpatient Medications Ordered in Epic   Medication Sig Dispense Refill   • esomeprazole (NEXIUM) 20 MG capsule Take 1 Cap by mouth every morning before breakfast. 90 Cap 3   • aspirin EC 81 MG EC tablet Take 1 Tab by mouth every day. 30 Tab 0   • atorvastatin (LIPITOR) 40 MG Tab Take 1 Tab by mouth every evening. 30 Tab 0   • lisinopril (PRINIVIL) 2.5 MG Tab Take 1 Tab by mouth every day. 30 Tab 0   • metoprolol SR (TOPROL XL) 50 MG TABLET SR 24 HR Take 1 Tab by mouth every day. 30 Tab 0   • meclizine (ANTIVERT) 25 MG Tab Take 1 Tab by mouth 3 times a day as needed. 30 Tab 0   • tamsulosin (FLOMAX) 0.4 MG capsule TAKE 1 CAP BY MOUTH ONE-HALF HOUR AFTER BREAKFAST. 90 Cap 2   • escitalopram (LEXAPRO) 20 MG tablet Take 1 Tab by mouth every day. 30 Tab 0     No current Epic-ordered facility-administered medications on file.        Past medical history, social history and family history were reviewed from chart today    Review of systems: Per HPI.    Denies headache, chest pain, fever, chills, diarrhea, constipation, abdominal pain, palpitations, depression   All others negative.     Exam:  /70 (BP Location: Left arm, Patient Position: Sitting, BP Cuff Size: Adult)   Pulse 75   Temp 36.4 °C (97.6 °F) (Temporal)   Resp 16   SpO2 97%   General: Well-appearing. Well-developed. No signs of distress.  HEENT: Grossly normal. Oral cavity is pink and moist.  Neck: Supple without JVD or bruit.  Pulmonary: Clear with good breath sounds. Normal effort.  Cardiovascular: Regular. Carotid and radial pulses are intact.  Abdomen: Soft, nontender, nondistended. Spleen and liver are not enlarged.  Neurologic: Cranial nerves II through XII are grossly normal, alert and oriented x3      Diagnosis:  1. NSTEMI (non-ST elevated myocardial infarction) (HCC)     2. Benign paroxysmal positional vertigo due to bilateral vestibular disorder     3. Late onset Alzheimer's disease without behavioral disturbance (HCC)     4. Essential hypertension     5. Coronary artery disease involving coronary bypass graft of native heart without angina pectoris     6. Presence of permanent cardiac pacemaker         Patient with recent non-ST elevation acute myocardial infarction.  He had no interventional procedures.  It was recommended by cardiology to treat medically only.  He has been chest pain-free.  He had 2 medications changed during the hospitalization:  Lisinopril for losartan.  Atorvastatin for rosuvastatin.  He remains on a beta-blocker.  He is also on aspirin.  He was not started on anticoagulation such as Eliquis or Xarelto due to his high fall risk.  He has required assistance from EMS on multiple occasions at his home to help him get up from a fall.    I will send in a prescription for nitroglycerin that can be used as needed for angina.  Refer to cardiology for ongoing surveillance as well as routine evaluation of his pacemaker.

## 2020-12-03 ENCOUNTER — OFFICE VISIT (OUTPATIENT)
Dept: INTERNAL MEDICINE | Facility: IMAGING CENTER | Age: 85
End: 2020-12-03
Payer: MEDICARE

## 2020-12-03 VITALS
OXYGEN SATURATION: 94 % | TEMPERATURE: 98 F | SYSTOLIC BLOOD PRESSURE: 110 MMHG | RESPIRATION RATE: 14 BRPM | DIASTOLIC BLOOD PRESSURE: 64 MMHG | HEART RATE: 81 BPM | WEIGHT: 210 LBS | BODY MASS INDEX: 31.94 KG/M2

## 2020-12-03 DIAGNOSIS — E78.00 PURE HYPERCHOLESTEROLEMIA: ICD-10-CM

## 2020-12-03 DIAGNOSIS — Z95.2 S/P TAVR (TRANSCATHETER AORTIC VALVE REPLACEMENT): ICD-10-CM

## 2020-12-03 DIAGNOSIS — Z95.1 HX OF CABG: ICD-10-CM

## 2020-12-03 DIAGNOSIS — I25.810 CORONARY ARTERY DISEASE INVOLVING CORONARY BYPASS GRAFT OF NATIVE HEART WITHOUT ANGINA PECTORIS: ICD-10-CM

## 2020-12-03 DIAGNOSIS — F01.50 VASCULAR DEMENTIA WITHOUT BEHAVIORAL DISTURBANCE (HCC): ICD-10-CM

## 2020-12-03 DIAGNOSIS — E66.09 CLASS 1 OBESITY DUE TO EXCESS CALORIES WITH SERIOUS COMORBIDITY AND BODY MASS INDEX (BMI) OF 31.0 TO 31.9 IN ADULT: ICD-10-CM

## 2020-12-03 DIAGNOSIS — I10 ESSENTIAL HYPERTENSION: ICD-10-CM

## 2020-12-03 PROCEDURE — 99214 OFFICE O/P EST MOD 30 MIN: CPT | Performed by: INTERNAL MEDICINE

## 2020-12-03 ASSESSMENT — FIBROSIS 4 INDEX: FIB4 SCORE: 4.18

## 2020-12-03 NOTE — PROGRESS NOTES
Chief Complaint   Patient presents with   • Heart Problem   • Dementia       HISTORY OF THE PRESENT ILLNESS: Patient is a 89 y.o. male.     Patient comes in with his son and daughter.  The visit today was primarily to discuss his recent medical events, current diagnoses, medications and ongoing needs.  His daughter Malka and son Stanislav were present.    He recently was hospitalized for acute myocardial infarction.  He was recommended that he be treated medically only.  The family was in total agreement with this.  He has not experienced any chest pain.  He has nitroglycerin present but I recommended that only the caregivers give it to him due to his cognitive status and dementia..  I also believe his wife is not capable.    He has a history of dementia.  I suspect this is vascular dementia.  Typically during office visit he is appropriate however he will ask the same question multiple times.  He will have some difficulties with word finding.  His overall cognition is good.  He does not require assistance with daily living but does not do any cooking.  He does not drive.    His cardiac issues include previous bypass and TAVR.    Allergies: Pcn [penicillins] and Sulfa drugs    Current Outpatient Medications Ordered in Epic   Medication Sig Dispense Refill   • esomeprazole (NEXIUM) 20 MG capsule Take 1 Cap by mouth every morning before breakfast. 90 Cap 3   • nitroGLYCERIN (NITROSTAT) 0.3 MG SL tablet Placement under tongue as needed for chest pain.  Okay to repeat every 5-minute for a total of 3 doses if pain does not resolve call 100 Tab 1   • aspirin EC 81 MG EC tablet Take 1 Tab by mouth every day. 30 Tab 0   • atorvastatin (LIPITOR) 40 MG Tab Take 1 Tab by mouth every evening. 30 Tab 0   • lisinopril (PRINIVIL) 2.5 MG Tab Take 1 Tab by mouth every day. 30 Tab 0   • metoprolol SR (TOPROL XL) 50 MG TABLET SR 24 HR Take 1 Tab by mouth every day. 30 Tab 0   • meclizine (ANTIVERT) 25 MG Tab Take 1 Tab by mouth 3 times a  day as needed. 30 Tab 0   • tamsulosin (FLOMAX) 0.4 MG capsule TAKE 1 CAP BY MOUTH ONE-HALF HOUR AFTER BREAKFAST. 90 Cap 2   • escitalopram (LEXAPRO) 20 MG tablet Take 1 Tab by mouth every day. 30 Tab 0     No current Epic-ordered facility-administered medications on file.        Past medical history, social history and family history were reviewed from chart today    Review of systems: Per HPI.    Denies headache, chest pain, fever, chills, diarrhea, constipation, abdominal pain, palpitations, depression   All others negative.     Exam: /64 (BP Location: Left arm, Patient Position: Sitting, BP Cuff Size: Large adult)   Pulse 81   Temp 36.7 °C (98 °F) (Temporal)   Resp 14   Wt 95.3 kg (210 lb)   SpO2 94%   General: Well-appearing. Well-developed. No signs of distress.  HEENT: Grossly normal. Oral cavity is pink and moist.  Neck: Supple without JVD or bruit.  Pulmonary: Clear with good breath sounds. Normal effort.  Cardiovascular: Regular. Carotid and radial pulses are intact.  Abdomen: Soft, nontender, nondistended. Spleen and liver are not enlarged.  Neurologic: Cranial nerves II through XII are grossly normal, alert and oriented x3      Diagnosis:  1. Vascular dementia without behavioral disturbance (HCC)     2. Coronary artery disease involving coronary bypass graft of native heart without angina pectoris     3. Pure hypercholesterolemia     4. Essential hypertension     5. S/P TAVR (transcatheter aortic valve replacement)     6. Hx of CABG     7. Class 1 obesity due to excess calories with serious comorbidity and body mass index (BMI) of 31.0 to 31.9 in adult         Overall cognitive status has been stable.  I discussed with the family that I think it is appropriate that he continue to live with his wife in their home.  They have caregivers for most of the day.  There is some concern that they do not have overnight care and I expressed my concern since there have been multiple EMS calls during this  time for both he and his wife.  They may want to consider assisted living.    Cardiac issues are stable.  He is on appropriate medications and remains chest pain-free.  Recommend no change in medication.  Recommend we continue with medication only treatment.    We discussed his weight.  This is been an ongoing issue.  I have discussed this with his caregivers also.  His son inquires about the best diet.  I recommended a Mediterranean diet.  The best overall diet would be a plant-based diet but they feel it is unlikely he will follow this or the caregivers will be able to manage.  They will talk to them about changing diet.  I also encouraged him to reduce overall caloric intake due to his weight.    45 minutes was spent with the patient in today's consultation. More than 30 minutes was spent in counseling and coordinating care

## 2020-12-11 RX ORDER — METOPROLOL SUCCINATE 50 MG/1
50 TABLET, EXTENDED RELEASE ORAL DAILY
Qty: 90 TAB | Refills: 1 | Status: SHIPPED | OUTPATIENT
Start: 2020-12-11 | End: 2021-06-07

## 2020-12-11 RX ORDER — ASPIRIN 81 MG/1
81 TABLET ORAL DAILY
Qty: 100 TAB | Refills: 3 | Status: SHIPPED | OUTPATIENT
Start: 2020-12-11

## 2020-12-11 RX ORDER — LISINOPRIL 2.5 MG/1
2.5 TABLET ORAL DAILY
Qty: 90 TAB | Refills: 1 | Status: SHIPPED | OUTPATIENT
Start: 2020-12-11 | End: 2021-06-07

## 2020-12-17 RX ORDER — ATORVASTATIN CALCIUM 40 MG/1
TABLET, FILM COATED ORAL
Qty: 90 TAB | Refills: 1 | Status: SHIPPED | OUTPATIENT
Start: 2020-12-17

## 2020-12-28 ENCOUNTER — OFFICE VISIT (OUTPATIENT)
Dept: INTERNAL MEDICINE | Facility: IMAGING CENTER | Age: 85
End: 2020-12-28
Payer: MEDICARE

## 2020-12-28 VITALS
DIASTOLIC BLOOD PRESSURE: 70 MMHG | HEART RATE: 90 BPM | OXYGEN SATURATION: 92 % | SYSTOLIC BLOOD PRESSURE: 110 MMHG | TEMPERATURE: 97.8 F | RESPIRATION RATE: 16 BRPM

## 2020-12-28 DIAGNOSIS — N64.4 BREAST TENDERNESS IN MALE: ICD-10-CM

## 2020-12-28 PROCEDURE — 99214 OFFICE O/P EST MOD 30 MIN: CPT | Performed by: INTERNAL MEDICINE

## 2020-12-28 NOTE — PROGRESS NOTES
Chief Complaint   Patient presents with   • Breast Pain       HISTORY OF THE PRESENT ILLNESS: Patient is a 89 y.o. male.     Patient comes in for evaluation of left breast tenderness.  Symptoms have been ongoing for 2 weeks.  No discharge from the breast.  No trauma.  No obvious mass.  No history of issues with the breast.  He had similar symptoms in 2017.  He had negative ultrasound and mammogram at that time.    Allergies: Pcn [penicillins] and Sulfa drugs    Current Outpatient Medications Ordered in Epic   Medication Sig Dispense Refill   • atorvastatin (LIPITOR) 40 MG Tab TAKE 1 TABLET BY MOUTH EVERY DAY IN THE EVENING 90 Tab 1   • metoprolol SR (TOPROL XL) 50 MG TABLET SR 24 HR Take 1 Tab by mouth every day. 90 Tab 1   • lisinopril (PRINIVIL) 2.5 MG Tab Take 1 Tab by mouth every day. 90 Tab 1   • aspirin 81 MG EC tablet Take 1 Tab by mouth every day. 100 Tab 3   • esomeprazole (NEXIUM) 20 MG capsule Take 1 Cap by mouth every morning before breakfast. 90 Cap 3   • nitroGLYCERIN (NITROSTAT) 0.3 MG SL tablet Placement under tongue as needed for chest pain.  Okay to repeat every 5-minute for a total of 3 doses if pain does not resolve call 100 Tab 1   • meclizine (ANTIVERT) 25 MG Tab Take 1 Tab by mouth 3 times a day as needed. 30 Tab 0   • tamsulosin (FLOMAX) 0.4 MG capsule TAKE 1 CAP BY MOUTH ONE-HALF HOUR AFTER BREAKFAST. 90 Cap 2   • escitalopram (LEXAPRO) 20 MG tablet Take 1 Tab by mouth every day. 30 Tab 0     No current Epic-ordered facility-administered medications on file.        Past medical history, social history and family history were reviewed from chart today    Review of systems: Per HPI.    All others negative.     Exam: /70 (BP Location: Left arm, Patient Position: Sitting, BP Cuff Size: Adult)   Pulse 90   Temp 36.6 °C (97.8 °F) (Temporal)   Resp 16   SpO2 92%   General: Well-nourished, well-developed. No change in appearance. No distress.  HEENT: Normocephalic.  Pulmonary: Clear..  Normal effort.  Cardiovascular: Regular   Abdomen: Normal appearing. Soft, nontender, nondistended.   Neurologic: Cranial nerves II through XII are grossly intact, alert and oriented x3  Breast: Breasts are normal in appearance and symmetric.  There is tenderness in the left breast immediately next to the areola at approximately the 2 o'clock position.  No obvious mass.  The right breast is nontender    Diagnosis:  1. Breast tenderness in male  MA DIAGNOSTIC MAMMO LEFT W/CAD    US-BREAST LIMITED-LEFT       Breast tenderness.  We discussed this back in 2017 but have not discussed it since.  She has noticed an increase in symptoms over the last 2 weeks.  He is not on obvious medication associated breast issues including digoxin or spironolactone.  He is not on any other hormone supplementation or suppressant.  No discharge but if symptoms persist consider prolactin level.    Patient is understandably concerned about breast cancer.  I discussed with him that I think this is unlikely but despite previous work-up in 2017 I recommend repeat evaluation as it appears he has been symptom-free for the last 3 years    Medications:  No medications    Laboratory:  Consider prolactin if symptoms persist    Imaging:  As above    Referrals:  None currently but general surgeon if symptoms persist

## 2021-01-11 DIAGNOSIS — Z23 NEED FOR VACCINATION: ICD-10-CM

## 2021-01-13 ENCOUNTER — HOSPITAL ENCOUNTER (OUTPATIENT)
Dept: RADIOLOGY | Facility: MEDICAL CENTER | Age: 86
End: 2021-01-13
Attending: INTERNAL MEDICINE
Payer: MEDICARE

## 2021-01-13 DIAGNOSIS — N64.4 BREAST TENDERNESS IN MALE: ICD-10-CM

## 2021-01-13 PROCEDURE — 76642 ULTRASOUND BREAST LIMITED: CPT | Mod: LT

## 2021-01-13 PROCEDURE — G0279 TOMOSYNTHESIS, MAMMO: HCPCS

## 2021-01-21 ENCOUNTER — OFFICE VISIT (OUTPATIENT)
Dept: INTERNAL MEDICINE | Facility: IMAGING CENTER | Age: 86
End: 2021-01-21
Payer: MEDICARE

## 2021-01-21 VITALS
OXYGEN SATURATION: 92 % | TEMPERATURE: 97.1 F | HEART RATE: 74 BPM | SYSTOLIC BLOOD PRESSURE: 130 MMHG | RESPIRATION RATE: 16 BRPM | DIASTOLIC BLOOD PRESSURE: 78 MMHG

## 2021-01-21 DIAGNOSIS — H35.30 MACULAR DEGENERATION OF BOTH EYES, UNSPECIFIED TYPE: ICD-10-CM

## 2021-01-21 DIAGNOSIS — F02.80 LATE ONSET ALZHEIMER'S DISEASE WITHOUT BEHAVIORAL DISTURBANCE (HCC): ICD-10-CM

## 2021-01-21 DIAGNOSIS — G30.1 LATE ONSET ALZHEIMER'S DISEASE WITHOUT BEHAVIORAL DISTURBANCE (HCC): ICD-10-CM

## 2021-01-21 DIAGNOSIS — M25.561 ACUTE PAIN OF BOTH KNEES: ICD-10-CM

## 2021-01-21 DIAGNOSIS — M25.562 ACUTE PAIN OF BOTH KNEES: ICD-10-CM

## 2021-01-21 PROCEDURE — 99214 OFFICE O/P EST MOD 30 MIN: CPT | Performed by: INTERNAL MEDICINE

## 2021-01-22 NOTE — PROGRESS NOTES
Chief Complaint   Patient presents with   • Knee Pain       HISTORY OF THE PRESENT ILLNESS: Patient is a 89 y.o. male.     Patient comes in with his caregiver and wife.  He complains of bilateral knee pain.  They report he has been complaining about this for a week.  No swelling.  No trauma.  He has a history of arthritis in the knee.  He was seen by orthopedics more than 10 years ago and diagnosed with osteoarthritis.  At that time he underwent injections with good results.  He has not been taking any oral medication.  He has applied topical Voltaren with some benefit.    Patient is concerned he may have macular degeneration.  He has been taking ocular vitamins.  No recent evaluation.  Patient is a previous practicing ophthalmologist.      Allergies: Pcn [penicillins] and Sulfa drugs    Current Outpatient Medications Ordered in Epic   Medication Sig Dispense Refill   • atorvastatin (LIPITOR) 40 MG Tab TAKE 1 TABLET BY MOUTH EVERY DAY IN THE EVENING 90 Tab 1   • metoprolol SR (TOPROL XL) 50 MG TABLET SR 24 HR Take 1 Tab by mouth every day. 90 Tab 1   • lisinopril (PRINIVIL) 2.5 MG Tab Take 1 Tab by mouth every day. 90 Tab 1   • aspirin 81 MG EC tablet Take 1 Tab by mouth every day. 100 Tab 3   • esomeprazole (NEXIUM) 20 MG capsule Take 1 Cap by mouth every morning before breakfast. 90 Cap 3   • nitroGLYCERIN (NITROSTAT) 0.3 MG SL tablet Placement under tongue as needed for chest pain.  Okay to repeat every 5-minute for a total of 3 doses if pain does not resolve call 100 Tab 1   • meclizine (ANTIVERT) 25 MG Tab Take 1 Tab by mouth 3 times a day as needed. 30 Tab 0   • tamsulosin (FLOMAX) 0.4 MG capsule TAKE 1 CAP BY MOUTH ONE-HALF HOUR AFTER BREAKFAST. 90 Cap 2   • escitalopram (LEXAPRO) 20 MG tablet Take 1 Tab by mouth every day. 30 Tab 0     No current Epic-ordered facility-administered medications on file.        Past medical history, social history and family history were reviewed from chart today    Review of  systems: Per HPI.    Denies headache, chest pain, fever, chills, diarrhea, constipation, abdominal pain, palpitations, depression   All others negative.     Exam: /78 (BP Location: Left arm, Patient Position: Sitting, BP Cuff Size: Adult)   Pulse 74   Temp 36.2 °C (97.1 °F) (Temporal)   Resp 16   SpO2 92%   General: Well-appearing. Well-developed. No signs of distress.  HEENT: Grossly normal. Oral cavity is pink and moist.  Neck: Supple without JVD or bruit.  Pulmonary: Clear with good breath sounds. Normal effort.  Cardiovascular: Regular. Carotid and radial pulses are intact.  Abdomen: Soft, nontender, nondistended. Spleen and liver are not enlarged.  Neurologic: Cranial nerves II through XII are grossly normal, alert and oriented x3  Extremities: Diffuse enlargement of the knee joints bilaterally consistent with osteoarthritis    Diagnosis:  1. Acute pain of both knees  REFERRAL TO ORTHOPEDICS   2. Late onset Alzheimer's disease without behavioral disturbance (HCC)     3. Macular degeneration of both eyes, unspecified type  REFERRAL TO OPHTHALMOLOGY       Suspect he has osteoarthritis of the knees.  History is somewhat confusing due to Alzheimer's however his story seems consistent.  He remembers the orthopedic evaluation and injections which he felt was helpful    Medications:  Okay to continue Voltaren.  Directions were given on use.  Recommend over-the-counter    Laboratory:  No labs    Imaging:  No new imaging.  I will defer to orthopedics    Referrals:  Orthopedics.  Refer to Dr. Davis for evaluation of macular degeneration

## 2021-03-11 DIAGNOSIS — F33.40 RECURRENT MAJOR DEPRESSIVE DISORDER, IN REMISSION (HCC): ICD-10-CM

## 2021-03-11 RX ORDER — ESCITALOPRAM OXALATE 10 MG/1
10 TABLET ORAL DAILY
Qty: 30 TABLET | Refills: 3 | Status: SHIPPED | OUTPATIENT
Start: 2021-03-11

## 2021-05-07 RX ORDER — TAMSULOSIN HYDROCHLORIDE 0.4 MG/1
0.4 CAPSULE ORAL
Qty: 90 CAPSULE | Refills: 2 | Status: SHIPPED | OUTPATIENT
Start: 2021-05-07

## 2021-06-04 ENCOUNTER — OFFICE VISIT (OUTPATIENT)
Dept: INTERNAL MEDICINE | Facility: IMAGING CENTER | Age: 86
End: 2021-06-04
Payer: MEDICARE

## 2021-06-04 ENCOUNTER — HOSPITAL ENCOUNTER (OUTPATIENT)
Facility: MEDICAL CENTER | Age: 86
End: 2021-06-04
Attending: INTERNAL MEDICINE
Payer: MEDICARE

## 2021-06-04 VITALS
TEMPERATURE: 97.6 F | OXYGEN SATURATION: 93 % | HEIGHT: 68 IN | BODY MASS INDEX: 31.67 KG/M2 | DIASTOLIC BLOOD PRESSURE: 70 MMHG | SYSTOLIC BLOOD PRESSURE: 110 MMHG | WEIGHT: 209 LBS | RESPIRATION RATE: 16 BRPM | HEART RATE: 83 BPM

## 2021-06-04 DIAGNOSIS — N40.1 BENIGN PROSTATIC HYPERPLASIA WITH URINARY FREQUENCY: ICD-10-CM

## 2021-06-04 DIAGNOSIS — N18.31 STAGE 3A CHRONIC KIDNEY DISEASE: ICD-10-CM

## 2021-06-04 DIAGNOSIS — I10 ESSENTIAL HYPERTENSION: ICD-10-CM

## 2021-06-04 DIAGNOSIS — F01.50 VASCULAR DEMENTIA WITHOUT BEHAVIORAL DISTURBANCE (HCC): ICD-10-CM

## 2021-06-04 DIAGNOSIS — E78.00 PURE HYPERCHOLESTEROLEMIA: ICD-10-CM

## 2021-06-04 DIAGNOSIS — I50.32 CHRONIC DIASTOLIC CHF (CONGESTIVE HEART FAILURE) (HCC): ICD-10-CM

## 2021-06-04 DIAGNOSIS — Z95.2 S/P TAVR (TRANSCATHETER AORTIC VALVE REPLACEMENT): ICD-10-CM

## 2021-06-04 DIAGNOSIS — Z11.1 TUBERCULOSIS SCREENING: ICD-10-CM

## 2021-06-04 DIAGNOSIS — Z85.831 HX OF SARCOMA OF SOFT TISSUE: ICD-10-CM

## 2021-06-04 DIAGNOSIS — Z95.0 STATUS POST CARDIAC PACEMAKER PROCEDURE: ICD-10-CM

## 2021-06-04 DIAGNOSIS — F32.4 MAJOR DEPRESSIVE DISORDER WITH SINGLE EPISODE, IN PARTIAL REMISSION (HCC): ICD-10-CM

## 2021-06-04 DIAGNOSIS — M17.0 PRIMARY OSTEOARTHRITIS OF BOTH KNEES: ICD-10-CM

## 2021-06-04 DIAGNOSIS — I25.810 CORONARY ARTERY DISEASE INVOLVING CORONARY BYPASS GRAFT OF NATIVE HEART WITHOUT ANGINA PECTORIS: ICD-10-CM

## 2021-06-04 DIAGNOSIS — R35.0 BENIGN PROSTATIC HYPERPLASIA WITH URINARY FREQUENCY: ICD-10-CM

## 2021-06-04 DIAGNOSIS — K21.9 GASTROESOPHAGEAL REFLUX DISEASE WITHOUT ESOPHAGITIS: ICD-10-CM

## 2021-06-04 LAB
ALBUMIN SERPL BCP-MCNC: 3.7 G/DL (ref 3.2–4.9)
ALBUMIN/GLOB SERPL: 1.2 G/DL
ALP SERPL-CCNC: 54 U/L (ref 30–99)
ALT SERPL-CCNC: 18 U/L (ref 2–50)
ANION GAP SERPL CALC-SCNC: 10 MMOL/L (ref 7–16)
AST SERPL-CCNC: 22 U/L (ref 12–45)
BASOPHILS # BLD AUTO: 0.5 % (ref 0–1.8)
BASOPHILS # BLD: 0.04 K/UL (ref 0–0.12)
BILIRUB SERPL-MCNC: 0.4 MG/DL (ref 0.1–1.5)
BUN SERPL-MCNC: 27 MG/DL (ref 8–22)
CALCIUM SERPL-MCNC: 9.3 MG/DL (ref 8.5–10.5)
CHLORIDE SERPL-SCNC: 103 MMOL/L (ref 96–112)
CHOLEST SERPL-MCNC: 145 MG/DL (ref 100–199)
CO2 SERPL-SCNC: 24 MMOL/L (ref 20–33)
CREAT SERPL-MCNC: 1.36 MG/DL (ref 0.5–1.4)
EOSINOPHIL # BLD AUTO: 0.4 K/UL (ref 0–0.51)
EOSINOPHIL NFR BLD: 5.2 % (ref 0–6.9)
ERYTHROCYTE [DISTWIDTH] IN BLOOD BY AUTOMATED COUNT: 57.7 FL (ref 35.9–50)
GLOBULIN SER CALC-MCNC: 3.2 G/DL (ref 1.9–3.5)
GLUCOSE SERPL-MCNC: 118 MG/DL (ref 65–99)
HCT VFR BLD AUTO: 43.1 % (ref 42–52)
HDLC SERPL-MCNC: 51 MG/DL
HGB BLD-MCNC: 13.3 G/DL (ref 14–18)
IMM GRANULOCYTES # BLD AUTO: 0.04 K/UL (ref 0–0.11)
IMM GRANULOCYTES NFR BLD AUTO: 0.5 % (ref 0–0.9)
LDLC SERPL CALC-MCNC: 80 MG/DL
LYMPHOCYTES # BLD AUTO: 0.88 K/UL (ref 1–4.8)
LYMPHOCYTES NFR BLD: 11.5 % (ref 22–41)
MCH RBC QN AUTO: 29.2 PG (ref 27–33)
MCHC RBC AUTO-ENTMCNC: 30.9 G/DL (ref 33.7–35.3)
MCV RBC AUTO: 94.5 FL (ref 81.4–97.8)
MONOCYTES # BLD AUTO: 0.72 K/UL (ref 0–0.85)
MONOCYTES NFR BLD AUTO: 9.4 % (ref 0–13.4)
NEUTROPHILS # BLD AUTO: 5.56 K/UL (ref 1.82–7.42)
NEUTROPHILS NFR BLD: 72.9 % (ref 44–72)
NRBC # BLD AUTO: 0 K/UL
NRBC BLD-RTO: 0 /100 WBC
PLATELET # BLD AUTO: 227 K/UL (ref 164–446)
PMV BLD AUTO: 13 FL (ref 9–12.9)
POTASSIUM SERPL-SCNC: 4.7 MMOL/L (ref 3.6–5.5)
PROT SERPL-MCNC: 6.9 G/DL (ref 6–8.2)
RBC # BLD AUTO: 4.56 M/UL (ref 4.7–6.1)
SODIUM SERPL-SCNC: 137 MMOL/L (ref 135–145)
TRIGL SERPL-MCNC: 72 MG/DL (ref 0–149)
WBC # BLD AUTO: 7.6 K/UL (ref 4.8–10.8)

## 2021-06-04 PROCEDURE — 85025 COMPLETE CBC W/AUTO DIFF WBC: CPT

## 2021-06-04 PROCEDURE — 99214 OFFICE O/P EST MOD 30 MIN: CPT | Performed by: INTERNAL MEDICINE

## 2021-06-04 PROCEDURE — 86480 TB TEST CELL IMMUN MEASURE: CPT | Mod: GZ

## 2021-06-04 PROCEDURE — 80053 COMPREHEN METABOLIC PANEL: CPT

## 2021-06-04 PROCEDURE — 80061 LIPID PANEL: CPT

## 2021-06-04 ASSESSMENT — FIBROSIS 4 INDEX: FIB4 SCORE: 4.18

## 2021-06-07 LAB
GAMMA INTERFERON BACKGROUND BLD IA-ACNC: 0.03 IU/ML
M TB IFN-G BLD-IMP: NEGATIVE
M TB IFN-G CD4+ BCKGRND COR BLD-ACNC: -0.01 IU/ML
MITOGEN IGNF BCKGRD COR BLD-ACNC: 9.01 IU/ML
QFT TB2 - NIL TBQ2: -0.01 IU/ML

## 2021-06-07 RX ORDER — METOPROLOL SUCCINATE 50 MG/1
TABLET, EXTENDED RELEASE ORAL
Qty: 90 TABLET | Refills: 0 | Status: SHIPPED | OUTPATIENT
Start: 2021-06-07

## 2021-06-07 RX ORDER — LISINOPRIL 2.5 MG/1
TABLET ORAL
Qty: 90 TABLET | Refills: 0 | Status: SHIPPED | OUTPATIENT
Start: 2021-06-07 | End: 2021-06-25

## 2021-06-25 DIAGNOSIS — I10 ESSENTIAL HYPERTENSION: ICD-10-CM

## 2021-06-25 RX ORDER — LISINOPRIL 2.5 MG/1
2.5 TABLET ORAL
Qty: 90 TABLET | Refills: 3 | Status: SHIPPED
Start: 2021-06-25